# Patient Record
Sex: FEMALE | Race: WHITE | NOT HISPANIC OR LATINO | Employment: FULL TIME | URBAN - NONMETROPOLITAN AREA
[De-identification: names, ages, dates, MRNs, and addresses within clinical notes are randomized per-mention and may not be internally consistent; named-entity substitution may affect disease eponyms.]

---

## 2023-04-21 NOTE — PROGRESS NOTES
"Subjective     Candice Powell is a 67 y.o. female who presents for Establish Care (New patient/establish care).      HPI  The patient is a 67 year-old female presenting to the clinic as a new patient to establish care.  Discussed health maintenance. Educated the patient on preventive care.  Patient presents with shoulder pain.  Order placed for Xray 4/24/2023.  Discussed Tinnitus of ears.  Referral placed with ENT 4/24/2023.  Order placed for Mammogram 4/24/2023.  Order placed for Colonoscopy 4/24/2023.  Order placed for DEXA bone density scan 4/24/2023.  Complete blood work after fasting for 10 hours.  Follow up in office.     Educated on dyslipidemia and diet exercise.  We will continue monitor.  Educated on postmenopausal care and symptoms and management.  Complaining feeling tired.  Advised on diet exercise.  Educated on vitamin D deficiency.  Complaining of ringing in both ears.  Complaining pain in the right shoulder    On pain scale 7-8/10.  Squeezing throbbing pain.  Denies trauma.  Denies injury.      Review of Systems  Review of systems    General.  Denies fever.  Denies chills.    HEENT dictated as above  Respiratory.  Denies cough.  Denies shortness of breath.    Cardiovascular.  Denies chest pain.  Denies heart palpitations.  Denies shortness of breath.    Gastrointestinal.  Denies nausea vomiting diarrhea.  Denies abdominal pain.    Genitourinary denies burning urination.  Denies frequent urination.  Denies flank pain.  Denies blood in the urine.  Denies abnormal vaginal discharge.    Neurology.  Denies tingling numbness but denies weakness.  Denies headache.  Denies blurred vision.    Musculoskeletal.  Dictated as above  Endocrinology.  Denies cold intolerance.  Denies hot intolerance.    Psychiatric.  Denies depression.  Denies anxiety.  Denies suicidal.  Denies homicidal.    Objective   /82   Pulse 68   Ht 1.67 m (5' 5.75\")   Wt 101 kg (222 lb)   SpO2 98%   BMI 36.10 kg/m²        Physical " Exam  General.  Not in distress.  HEENT normocephalic anicteric sclerae.  Neck soft supple no thyromegaly.  No carotid bruit.  Lungs are clear.  Heart regular.  Abdomen soft nontender nondistended bowel sounds are positive.  Extremities no clubbing cyanosis or edema.  Psychiatric.  Has good eye contact.  No crying spells noted.  Speech was normal.  Denies depression.  Denies suicidal.  Denies homicidal.  Musculoskeletal.  Complaining pain with abduction of right shoulder.  Range of motion restricted with abduction.      Assessment/Plan     1.  Dyslipidemia.  Educated on diet exercise.  We will continue monitor.    2.  Postmenopausal.  Dictated as above.    3.  Fatigue.  Dictated as above.    4.  Vitamin D deficiency.  Educated on vitamin D deficiency.  We will continue monitor    5.  Tinnitus of both ears.  Recommended and referred to the ENT.    6.  Right shoulder pain.  Educated on shoulder exercises.  We will follow-up on the x-ray                          Problem List Items Addressed This Visit    None  Visit Diagnoses       Dyslipidemia    -  Primary    Relevant Orders    Comprehensive metabolic panel    Lipid panel    Colon cancer screening        Relevant Orders    Colonoscopy    Postmenopausal        Relevant Orders    XR DEXA bone density    Encounter for screening mammogram for malignant neoplasm of breast        Relevant Orders    BI mammo bilateral screening tomosynthesis    Other fatigue        Relevant Orders    CBC and Auto Differential    Urinalysis with Reflex Microscopic    Tsh With Reflex To Free T4 If Abnormal    Vitamin B12    Folate    Vitamin D deficiency        Relevant Orders    Vitamin D 25-Hydroxy,Total    Screening for condition        Relevant Orders    Hepatitis C antibody    Tinnitus of both ears        Relevant Orders    Referral to ENT    Acute pain of right shoulder        Relevant Orders    XR shoulder right 2+ views            Scribe Attestation  By signing my name below, IKarina  Ruby Mccray   attest that this documentation has been prepared under the direction and in the presence of Que Craft MD.

## 2023-04-24 ENCOUNTER — OFFICE VISIT (OUTPATIENT)
Dept: PRIMARY CARE | Facility: CLINIC | Age: 68
End: 2023-04-24
Payer: COMMERCIAL

## 2023-04-24 VITALS
OXYGEN SATURATION: 98 % | BODY MASS INDEX: 35.68 KG/M2 | HEART RATE: 68 BPM | HEIGHT: 66 IN | WEIGHT: 222 LBS | DIASTOLIC BLOOD PRESSURE: 82 MMHG | SYSTOLIC BLOOD PRESSURE: 142 MMHG

## 2023-04-24 DIAGNOSIS — Z78.0 POSTMENOPAUSAL: ICD-10-CM

## 2023-04-24 DIAGNOSIS — M25.511 ACUTE PAIN OF RIGHT SHOULDER: ICD-10-CM

## 2023-04-24 DIAGNOSIS — Z12.31 ENCOUNTER FOR SCREENING MAMMOGRAM FOR MALIGNANT NEOPLASM OF BREAST: ICD-10-CM

## 2023-04-24 DIAGNOSIS — H93.13 TINNITUS OF BOTH EARS: ICD-10-CM

## 2023-04-24 DIAGNOSIS — Z12.11 COLON CANCER SCREENING: ICD-10-CM

## 2023-04-24 DIAGNOSIS — Z13.9 SCREENING FOR CONDITION: ICD-10-CM

## 2023-04-24 DIAGNOSIS — R53.83 OTHER FATIGUE: ICD-10-CM

## 2023-04-24 DIAGNOSIS — E78.5 DYSLIPIDEMIA: Primary | ICD-10-CM

## 2023-04-24 DIAGNOSIS — E55.9 VITAMIN D DEFICIENCY: ICD-10-CM

## 2023-04-24 PROBLEM — Z76.89 ESTABLISHING CARE WITH NEW DOCTOR, ENCOUNTER FOR: Status: ACTIVE | Noted: 2023-04-24

## 2023-04-24 PROCEDURE — 1036F TOBACCO NON-USER: CPT | Performed by: FAMILY MEDICINE

## 2023-04-24 PROCEDURE — 1159F MED LIST DOCD IN RCRD: CPT | Performed by: FAMILY MEDICINE

## 2023-04-24 PROCEDURE — 99203 OFFICE O/P NEW LOW 30 MIN: CPT | Performed by: FAMILY MEDICINE

## 2023-04-24 PROCEDURE — 1160F RVW MEDS BY RX/DR IN RCRD: CPT | Performed by: FAMILY MEDICINE

## 2023-04-24 RX ORDER — VIT C/E/ZN/COPPR/LUTEIN/ZEAXAN 250MG-90MG
1000 CAPSULE ORAL DAILY
COMMUNITY

## 2023-04-24 ASSESSMENT — PAIN SCALES - GENERAL: PAINLEVEL: 0-NO PAIN

## 2023-04-24 ASSESSMENT — ENCOUNTER SYMPTOMS
LOSS OF SENSATION IN FEET: 0
OCCASIONAL FEELINGS OF UNSTEADINESS: 0
DEPRESSION: 0

## 2023-09-13 ENCOUNTER — OFFICE VISIT (OUTPATIENT)
Dept: PRIMARY CARE | Facility: CLINIC | Age: 68
End: 2023-09-13
Payer: COMMERCIAL

## 2023-09-13 VITALS
BODY MASS INDEX: 34.87 KG/M2 | HEIGHT: 66 IN | DIASTOLIC BLOOD PRESSURE: 88 MMHG | WEIGHT: 217 LBS | HEART RATE: 80 BPM | OXYGEN SATURATION: 97 % | SYSTOLIC BLOOD PRESSURE: 130 MMHG | TEMPERATURE: 97.5 F

## 2023-09-13 DIAGNOSIS — R05.1 ACUTE COUGH: Primary | ICD-10-CM

## 2023-09-13 PROBLEM — H93.13 TINNITUS OF BOTH EARS: Status: ACTIVE | Noted: 2020-09-14

## 2023-09-13 PROBLEM — G47.33 OSA (OBSTRUCTIVE SLEEP APNEA): Status: ACTIVE | Noted: 2017-10-24

## 2023-09-13 PROBLEM — M54.50 CHRONIC BILATERAL LOW BACK PAIN WITHOUT SCIATICA: Status: ACTIVE | Noted: 2017-10-24

## 2023-09-13 PROBLEM — H61.23 BILATERAL IMPACTED CERUMEN: Status: ACTIVE | Noted: 2018-04-30

## 2023-09-13 PROBLEM — H60.333 CHRONIC SWIMMER'S EAR OF BOTH SIDES: Status: ACTIVE | Noted: 2018-04-30

## 2023-09-13 PROBLEM — G47.00 INSOMNIA: Status: ACTIVE | Noted: 2017-10-24

## 2023-09-13 PROBLEM — L98.9 SKIN LESIONS: Status: ACTIVE | Noted: 2017-02-08

## 2023-09-13 PROBLEM — F43.9 STRESS: Status: ACTIVE | Noted: 2017-10-24

## 2023-09-13 PROBLEM — J01.01 ACUTE RECURRENT MAXILLARY SINUSITIS: Status: ACTIVE | Noted: 2018-04-30

## 2023-09-13 PROBLEM — G89.29 CHRONIC BILATERAL LOW BACK PAIN WITHOUT SCIATICA: Status: ACTIVE | Noted: 2017-10-24

## 2023-09-13 PROCEDURE — 1126F AMNT PAIN NOTED NONE PRSNT: CPT

## 2023-09-13 PROCEDURE — 1160F RVW MEDS BY RX/DR IN RCRD: CPT

## 2023-09-13 PROCEDURE — 99213 OFFICE O/P EST LOW 20 MIN: CPT

## 2023-09-13 PROCEDURE — 1036F TOBACCO NON-USER: CPT

## 2023-09-13 PROCEDURE — 1159F MED LIST DOCD IN RCRD: CPT

## 2023-09-13 PROCEDURE — 87635 SARS-COV-2 COVID-19 AMP PRB: CPT

## 2023-09-13 ASSESSMENT — ENCOUNTER SYMPTOMS
OCCASIONAL FEELINGS OF UNSTEADINESS: 0
DEPRESSION: 0
LOSS OF SENSATION IN FEET: 0

## 2023-09-13 ASSESSMENT — SOCIAL DETERMINANTS OF HEALTH (SDOH)

## 2023-09-13 ASSESSMENT — PATIENT HEALTH QUESTIONNAIRE - PHQ9
1. LITTLE INTEREST OR PLEASURE IN DOING THINGS: NOT AT ALL
2. FEELING DOWN, DEPRESSED OR HOPELESS: NOT AT ALL
SUM OF ALL RESPONSES TO PHQ9 QUESTIONS 1 & 2: 0

## 2023-09-13 NOTE — PROGRESS NOTES
Subjective   Patient ID: Candice Powell is a 67 y.o. female who presents for Establish Care (Candice is here to establish care. Was seen in urgent care for sinusitis. Has been in contact with spouse who is sick).  Establishing care today     Recent illness seen in Urgent care, diagnosed with Acute Sinusitis  Duration: Sick for 14 days, not feeling any improvement.     Started on Antibiotic Doxycycline on Thursday of last week with her  as well.    She has cough and congestion  Non-productive, no fevers noted.   Her  did test with home COVID test and was negative x2.     COVID swab today. It is my opinion that patient is at high risk of Acute COVID-19, all precautions were taken during the visit including masking with N95 and patient was masked.   Infection control maintained by provider and MA. Patient subsequently escorted out of the office by staff to minimize viral transmission.        Vitals:    09/13/23 1034   BP: 130/88   Pulse: 80   Temp: 36.4 °C (97.5 °F)   SpO2: 97%       Review of Systems    Objective   Physical Exam    Assessment/Plan   Problem List Items Addressed This Visit    None  Visit Diagnoses       Acute cough    -  Primary    Relevant Orders    Sars-CoV-2 PCR, Symptomatic                 Thank you for coming in today, please call my office if you have any concerns or questions.     Gatito EVANS, CNP

## 2023-09-13 NOTE — PATIENT INSTRUCTIONS
Mucinex DM otc would be good for cough, congestion take twice daily  Plenty of fluids  Finish antibiotic full course.     Thank you for coming in today, if any questions or concerns arise, please call my office.   CRISTINA Garcia-CNP

## 2023-09-14 ENCOUNTER — TELEPHONE (OUTPATIENT)
Dept: PRIMARY CARE | Facility: CLINIC | Age: 68
End: 2023-09-14
Payer: COMMERCIAL

## 2023-09-14 LAB — SARS-COV-2 RESULT: NOT DETECTED

## 2024-07-26 ENCOUNTER — APPOINTMENT (OUTPATIENT)
Dept: PRIMARY CARE | Facility: CLINIC | Age: 69
End: 2024-07-26
Payer: MEDICARE

## 2024-09-19 ENCOUNTER — APPOINTMENT (OUTPATIENT)
Dept: PRIMARY CARE | Facility: CLINIC | Age: 69
End: 2024-09-19
Payer: MEDICARE

## 2024-09-19 VITALS
SYSTOLIC BLOOD PRESSURE: 130 MMHG | BODY MASS INDEX: 36.15 KG/M2 | TEMPERATURE: 97.3 F | WEIGHT: 224 LBS | OXYGEN SATURATION: 98 % | DIASTOLIC BLOOD PRESSURE: 70 MMHG | HEART RATE: 82 BPM

## 2024-09-19 DIAGNOSIS — M17.12 LOCALIZED OSTEOARTHRITIS OF LEFT KNEE: Primary | ICD-10-CM

## 2024-09-19 PROCEDURE — 1036F TOBACCO NON-USER: CPT

## 2024-09-19 PROCEDURE — 99214 OFFICE O/P EST MOD 30 MIN: CPT

## 2024-09-19 PROCEDURE — 1159F MED LIST DOCD IN RCRD: CPT

## 2024-09-19 ASSESSMENT — ENCOUNTER SYMPTOMS
JOINT SWELLING: 1
ARTHRALGIAS: 1

## 2024-09-19 NOTE — PROGRESS NOTES
"Subjective   Patient ID: Candice Powell is a 68 y.o. female who presents for Knee Pain (L knee- intermittent- not painful today, she states it feels like something is not quite right, sometimes when walking her pain will be excruciating. She states sometimes it will feel like her knee cap is just \"floating\").  Knee Pain  Patient presents for follow up on a knee problem involving the left knee. Onset of the symptoms was several months ago. Inciting event: this is a longstanding problem which has been getting worse. Current symptoms include crepitus sensation, foreign body sensation, giving out, and states Patella feels like it is \"floating\" at times. Pain is aggravated by any weight bearing, lateral movements, and rising after sitting. Patient has had prior knee problems. Evaluation to date: none. Treatment to date: avoidance of offending activity.          Vitals:    09/19/24 0918   BP: 130/70   Pulse: 82   Temp: 36.3 °C (97.3 °F)   SpO2: 98%       Review of Systems   Musculoskeletal:  Positive for arthralgias, gait problem and joint swelling.       Objective   Physical Exam  Vitals and nursing note reviewed.   Constitutional:       Appearance: Normal appearance.   Musculoskeletal:      Right knee: Normal.      Left knee: Swelling, deformity, bony tenderness and crepitus present. No LCL laxity, MCL laxity, ACL laxity or PCL laxity.Normal patellar mobility.        Legs:    Neurological:      Mental Status: She is alert.         Assessment/Plan   Problem List Items Addressed This Visit    None  Visit Diagnoses       Localized osteoarthritis of left knee    -  Primary    Relevant Orders    XR knee left 3 views                 Thank you for coming in today, please call my office if you have any concerns or questions.     Gatito EVANS, CNP  "

## 2024-09-19 NOTE — PATIENT INSTRUCTIONS
ROM exercises with the shoulder    XR knee, can use compression brace  Voltaren Gel    Thank you for coming in today, if any questions or concerns arise, please call my office.   CRISTINA Garcia-CNP

## 2024-10-04 ENCOUNTER — HOSPITAL ENCOUNTER (OUTPATIENT)
Dept: RADIOLOGY | Facility: CLINIC | Age: 69
Discharge: HOME | End: 2024-10-04
Payer: MEDICARE

## 2024-10-04 DIAGNOSIS — M17.12 LOCALIZED OSTEOARTHRITIS OF LEFT KNEE: ICD-10-CM

## 2024-10-04 PROCEDURE — 73562 X-RAY EXAM OF KNEE 3: CPT | Mod: LT

## 2024-10-09 ENCOUNTER — TELEPHONE (OUTPATIENT)
Dept: PRIMARY CARE | Facility: CLINIC | Age: 69
End: 2024-10-09
Payer: MEDICARE

## 2024-10-09 NOTE — TELEPHONE ENCOUNTER
----- Message from Gatito Ag sent at 10/8/2024  3:39 PM EDT -----  Results were abnormal... no acute fracture, there is remote fracture deformity of the proximal fibula, this does not need surgery nor does it need further eval per my discussion with orthopedic. Let me know how she is doing with her conservative therapies. Thanks.

## 2024-10-09 NOTE — TELEPHONE ENCOUNTER
Candice notified of results and recommendations. She states she has good days and bad days with the conservative therapies.

## 2025-04-04 ENCOUNTER — PATIENT OUTREACH (OUTPATIENT)
Dept: CARE COORDINATION | Facility: CLINIC | Age: 70
End: 2025-04-04
Payer: MEDICARE

## 2025-04-09 ENCOUNTER — PATIENT OUTREACH (OUTPATIENT)
Dept: CARE COORDINATION | Facility: CLINIC | Age: 70
End: 2025-04-09
Payer: MEDICARE

## 2025-05-23 PROBLEM — Z17.0 MALIGNANT NEOPLASM OF UPPER-INNER QUADRANT OF LEFT BREAST IN FEMALE, ESTROGEN RECEPTOR POSITIVE: Status: ACTIVE | Noted: 2025-05-23

## 2025-05-23 PROBLEM — C50.212 MALIGNANT NEOPLASM OF UPPER-INNER QUADRANT OF LEFT BREAST IN FEMALE, ESTROGEN RECEPTOR POSITIVE: Status: ACTIVE | Noted: 2025-05-23

## 2025-05-30 ENCOUNTER — TELEPHONE (OUTPATIENT)
Dept: SURGERY | Facility: CLINIC | Age: 70
End: 2025-05-30

## 2025-05-30 ENCOUNTER — APPOINTMENT (OUTPATIENT)
Dept: SURGERY | Facility: CLINIC | Age: 70
End: 2025-05-30
Payer: MEDICARE

## 2025-05-30 VITALS
DIASTOLIC BLOOD PRESSURE: 79 MMHG | HEIGHT: 65 IN | TEMPERATURE: 98 F | HEART RATE: 76 BPM | WEIGHT: 230 LBS | SYSTOLIC BLOOD PRESSURE: 161 MMHG | BODY MASS INDEX: 38.32 KG/M2

## 2025-05-30 DIAGNOSIS — Z17.0 MALIGNANT NEOPLASM OF UPPER-INNER QUADRANT OF LEFT BREAST IN FEMALE, ESTROGEN RECEPTOR POSITIVE: Primary | ICD-10-CM

## 2025-05-30 DIAGNOSIS — Z17.0 MALIGNANT NEOPLASM OF UPPER-INNER QUADRANT OF LEFT BREAST IN FEMALE, ESTROGEN RECEPTOR POSITIVE: ICD-10-CM

## 2025-05-30 DIAGNOSIS — C50.212 MALIGNANT NEOPLASM OF UPPER-INNER QUADRANT OF LEFT BREAST IN FEMALE, ESTROGEN RECEPTOR POSITIVE: ICD-10-CM

## 2025-05-30 DIAGNOSIS — C50.212 MALIGNANT NEOPLASM OF UPPER-INNER QUADRANT OF LEFT BREAST IN FEMALE, ESTROGEN RECEPTOR POSITIVE: Primary | ICD-10-CM

## 2025-05-30 PROCEDURE — 99205 OFFICE O/P NEW HI 60 MIN: CPT | Performed by: SURGERY

## 2025-05-30 PROCEDURE — 1160F RVW MEDS BY RX/DR IN RCRD: CPT | Performed by: SURGERY

## 2025-05-30 PROCEDURE — 1159F MED LIST DOCD IN RCRD: CPT | Performed by: SURGERY

## 2025-05-30 PROCEDURE — 3008F BODY MASS INDEX DOCD: CPT | Performed by: SURGERY

## 2025-05-30 PROCEDURE — 1036F TOBACCO NON-USER: CPT | Performed by: SURGERY

## 2025-05-30 RX ORDER — CEFAZOLIN SODIUM 2 G/100ML
2 INJECTION, SOLUTION INTRAVENOUS ONCE
OUTPATIENT
Start: 2025-05-30 | End: 2025-05-30

## 2025-05-30 RX ORDER — ACETAMINOPHEN 325 MG/1
975 TABLET ORAL ONCE
OUTPATIENT
Start: 2025-05-30 | End: 2025-05-30

## 2025-05-30 RX ORDER — CELECOXIB 50 MG/1
200 CAPSULE ORAL ONCE
OUTPATIENT
Start: 2025-05-30 | End: 2025-05-30

## 2025-05-30 NOTE — PROGRESS NOTES
Subjective   Patient ID: Candice Powell is a 69 y.o. female who presents for second opinion on her left breast cancer.    HPI   This the patient was diagnosed with a left breast cancer at [facility].  She underwent image guided biopsy by a surgeon there.  This confirmed a cT1 infiltrating ductal cancer ER 80 OK 0 HER2 negative.  The left axilla was not interrogated by ultrasound.  She presents for second opinion and to discuss her surgical options.  NO FH of breast or ovarian cancer, NO previous breast surgery except for her recent biopsy, Menses at 13 , Menopause at 49 , Children 3 , Age at first child 15 , Age at last child 22 ,  Breast feeding last child only, Nipple discharge NO, Breast pain YES -at the site of the biopsy. birth control pill NO, Radiation NO, History of collagen vascular disease NO .      Medical History[1]     Medications Ordered Prior to Encounter[2]     Review of Systems   All other systems reviewed and are negative.      Vitals:    05/30/25 0844   BP: 161/79   Pulse: 76   Temp: 36.7 °C (98 °F)        Objective     Physical Exam  Vitals reviewed. Exam conducted with a chaperone present.   Constitutional:       Appearance: Normal appearance.   HENT:      Head: Normocephalic.   Cardiovascular:      Rate and Rhythm: Normal rate and regular rhythm.      Heart sounds: Normal heart sounds.   Pulmonary:      Effort: Pulmonary effort is normal.      Breath sounds: Normal breath sounds.   Chest:   Breasts:     Right: Normal.      Left: Normal.          Comments: Small lesion seen left breast at approximately 11-12 o'clock 11 cm from the nipple with ultrasound.  Clip noted.  Abdominal:      General: Abdomen is flat.      Palpations: Abdomen is soft. There is no mass.      Tenderness: There is no abdominal tenderness. There is no guarding.   Musculoskeletal:         General: Normal range of motion.   Lymphadenopathy:      Upper Body:      Right upper body: No axillary adenopathy.      Left upper body: No  axillary adenopathy.   Skin:     General: Skin is warm.   Neurological:      General: No focal deficit present.   Psychiatric:         Mood and Affect: Mood normal.       Review of ultrasound performed April 23, 2025 [facility]  BI mammo left diagnostic tomosynthesis  Order: [id number]  Impression    IMPRESSION:  Finding in the left breast is suspicious for malignancy.  Ultrasound-guided biopsy is recommended.    Review of pathology  SURGICAL PATHOLOGY  Order: [id number]  Component 1 mo ago   Case Report Surgical Pathology Report                         Case: [id number]                                Authorizing Provider:  Magda Blanchard MD Collected:           04/30/2025 07:43 AM          Ordering Location:     [facility] [place] [facility]            Received:            04/30/2025 10:06 AM          Pathologist:           Shreya Roberson MD                                                            Specimen:    Breast, Left, Core Biopsy, Left breast biopsy                                           FINAL DIAGNOSIS    A.  Left breast, core biopsy: Consistent with invasive ductal carcinoma.  Comment: The needle biopsy shows the malignant infiltrate in a single file pattern or small solid nests suspicious for infiltrating lobular carcinoma.  An immunostain for E-cadherin was performed.  The tumor cells stain strongly positive for E-cadherin..  The findings are consistent with invasive ductal carcinoma.  An intradepartmental consultation was obtained.  Breast marker profile studies (ER, IA and HER2) are being performed at [facility].  The results can be viewed in EPIC when they become available.     Problem List Items Addressed This Visit       Malignant neoplasm of upper-inner quadrant of left breast in female, estrogen receptor positive - Primary   cT1 NX M0 infiltrating ductal cancer ER 80 IA 0 HER2 negative left breast    Assessment/Plan   Plan-I had a long discussion with the patient and her .  We discussed breast  conserving surgery versus mastectomy.  I discussed with her the choosing wisely guidelines were breast conserving therapy would incorporate excision of the breast cancer only and not require sentinel biopsy.  We discussed adjuvant radiation therapy.  We discussed adjuvant hormonal therapy.  I indicated to her that adjuvant radiation therapy is necessary to prevent the risk of local recurrence.  She wishes to undergo breast conserving surgery.  She was scheduled on July 1, 2025.  She will require Magseed placement prior to her procedure and interrogation of the left axilla.  Her films have been downloaded into the  system for review by the breast center.      LEFT MAGSEED PARTIAL MASTECTOMY 7.1.25   Risks include, but not limited to pain, infection, bleeding,  risk of positive margins, need for re-excision, risk of cardiac, pulmonary, neurologic, locomotor, anesthetic events,  and other unforeseen complications including death.    Magseed to be placed in the left breast.  Left axillary ultrasound to be performed to image left axilla        Don Roger MD        [1] History reviewed. No pertinent past medical history.  [2]   Current Outpatient Medications on File Prior to Visit   Medication Sig Dispense Refill    cholecalciferol (Vitamin D-3) 25 MCG (1000 UT) capsule Take 1 capsule (25 mcg) by mouth once daily.      multivitamin tablet,chewable Chew 1 tablet early in the morning..       No current facility-administered medications on file prior to visit.

## 2025-05-30 NOTE — PATIENT INSTRUCTIONS
You have stage I cancer of the left breast as we discussed.  You have opted undergo breast conserving therapy.  We discussed the choosing wisely guidelines which would recommend removing the tumor only.  You will have a Magseed placed prior to this.  You will also have ultrasound of the left axilla.  Your surgery will be performed on July 1, 2025.  My office will provide you with preprocedure instructions.  They will also set up your Magseed and left x-ray ultrasound.  I have given your breast cancer folder to read.  If you have any questions do not hesitate to contact my office.  I will contact you before your surgery to follow-up after Magseed placement.  You will also be referred to medical oncology and radiation oncology.  This will occur  3 weeks after your surgery

## 2025-05-30 NOTE — TELEPHONE ENCOUNTER
----- Message from Don Roger sent at 5/30/2025  9:30 AM EDT -----  Ronel - confirm receipt of note by Dr Flores

## 2025-06-11 ENCOUNTER — PRE-ADMISSION TESTING (OUTPATIENT)
Dept: PREADMISSION TESTING | Facility: HOSPITAL | Age: 70
End: 2025-06-11
Payer: MEDICARE

## 2025-06-11 ENCOUNTER — ANESTHESIA EVENT (OUTPATIENT)
Dept: OPERATING ROOM | Facility: HOSPITAL | Age: 70
End: 2025-06-11
Payer: MEDICARE

## 2025-06-11 VITALS
TEMPERATURE: 97 F | RESPIRATION RATE: 16 BRPM | SYSTOLIC BLOOD PRESSURE: 157 MMHG | DIASTOLIC BLOOD PRESSURE: 84 MMHG | HEART RATE: 67 BPM | OXYGEN SATURATION: 99 %

## 2025-06-11 DIAGNOSIS — R19.04 LEFT LOWER QUADRANT ABDOMINAL MASS: Primary | ICD-10-CM

## 2025-06-11 DIAGNOSIS — Z01.818 PREOP TESTING: ICD-10-CM

## 2025-06-11 PROCEDURE — 87081 CULTURE SCREEN ONLY: CPT | Mod: GENLAB

## 2025-06-11 RX ORDER — CHLORHEXIDINE GLUCONATE ORAL RINSE 1.2 MG/ML
15 SOLUTION DENTAL DAILY
Qty: 30 ML | Refills: 0 | Status: SHIPPED | OUTPATIENT
Start: 2025-06-11 | End: 2025-06-13

## 2025-06-11 RX ORDER — ERGOCALCIFEROL 1.25 MG/1
1.25 CAPSULE ORAL WEEKLY
COMMUNITY

## 2025-06-11 RX ORDER — CHLORHEXIDINE GLUCONATE 40 MG/ML
SOLUTION TOPICAL DAILY
Qty: 354 ML | Refills: 0 | Status: SHIPPED | OUTPATIENT
Start: 2025-06-11 | End: 2025-06-16

## 2025-06-11 ASSESSMENT — DUKE ACTIVITY SCORE INDEX (DASI)
DASI METS SCORE: 8
CAN YOU RUN A SHORT DISTANCE: NO
CAN YOU PARTICIPATE IN STRENOUS SPORTS LIKE SWIMMING, SINGLES TENNIS, FOOTBALL, BASKETBALL, OR SKIING: NO
CAN YOU DO LIGHT WORK AROUND THE HOUSE LIKE DUSTING OR WASHING DISHES: YES
CAN YOU WALK A BLOCK OR TWO ON LEVEL GROUND: YES
CAN YOU DO HEAVY WORK AROUND THE HOUSE LIKE SCRUBBING FLOORS OR LIFTING AND MOVING HEAVY FURNITURE: YES
CAN YOU CLIMB A FLIGHT OF STAIRS OR WALK UP A HILL: YES
CAN YOU DO YARD WORK LIKE RAKING LEAVES, WEEDING OR PUSHING A MOWER: YES
CAN YOU DO MODERATE WORK AROUND THE HOUSE LIKE VACUUMING, SWEEPING FLOORS OR CARRYING GROCERIES: YES
CAN YOU PARTICIPATE IN MODERATE RECREATIONAL ACTIVITIES LIKE GOLF, BOWLING, DANCING, DOUBLES TENNIS OR THROWING A BASEBALL OR FOOTBALL: YES
CAN YOU WALK INDOORS, SUCH AS AROUND YOUR HOUSE: YES
TOTAL_SCORE: 42.7
CAN YOU HAVE SEXUAL RELATIONS: YES
CAN YOU TAKE CARE OF YOURSELF (EAT, DRESS, BATHE, OR USE TOILET): YES

## 2025-06-11 ASSESSMENT — PAIN - FUNCTIONAL ASSESSMENT: PAIN_FUNCTIONAL_ASSESSMENT: 0-10

## 2025-06-11 ASSESSMENT — PAIN SCALES - GENERAL: PAINLEVEL_OUTOF10: 0 - NO PAIN

## 2025-06-11 NOTE — PREPROCEDURE INSTRUCTIONS
Medication List            Accurate as of June 11, 2025  8:50 AM. Always use your most recent med list.                * chlorhexidine 4 % external liquid  Commonly known as: Hibiclens  Apply topically once daily for 5 days.  Medication Adjustments for Surgery: Take/Use as prescribed     * chlorhexidine 0.12 % solution  Commonly known as: Peridex  Use 15 mL in the mouth or throat once daily for 2 days. Once the night before surgery and once the morning of  Medication Adjustments for Surgery: Take/Use as prescribed     ergocalciferol 1250 mcg (50,000 units) capsule  Commonly known as: Vitamin D-2  Additional Medication Adjustments for Surgery: Take last dose 7 days before surgery     MAGNESIUM GLUCONATE ORAL  Additional Medication Adjustments for Surgery: Take last dose 7 days before surgery           * This list has 2 medication(s) that are the same as other medications prescribed for you. Read the directions carefully, and ask your doctor or other care provider to review them with you.                              Call outpatient surgery the day before between 1-3 pm to get your arrival time.   [phone]    No food after midnight including candy, gum or mints.     You can have clear liquids up to 3 hrs prior to your procedure.  NO DAIRY, NO CREAMER, NO NON DAIRY OR ALMOND, OAT, COCONUT ETC.    Please refrain from smoking THC, cigarettes or vaping prior to your procedure at least 24 hrs.     When  you arrive park in the back ER parking lot.  Come through the ER lobby and take the first elevator to second floor.   Check in on the outpatient surgery window.    Leave all valuables at home and remove all piercing's.      Do mouth wash and bath for infection control if applicable.      NO driving for 24 hrs if you have had anesthesia or sedation.   You will also need a  if you are receiving sedation.       Bring glasses so you can read what you are signing.

## 2025-06-11 NOTE — ANESTHESIA PREPROCEDURE EVALUATION
Patient: Candice Powell    Procedure Information       Date/Time: 07/01/25 0920    Procedure: PARTIAL MASTECTOMY  BREAST AFTER MAGNETIC SEED LOCALIZATION (Left)    Location: [facility] OR [facility] / Virtual [facility] OR    Surgeons: Don Roger MD            Relevant Problems   Anesthesia (within normal limits)      Cardiac   (+) Heart murmur   (+) Mild aortic regurgitation      Pulmonary   (+) ALDA (obstructive sleep apnea)      Neuro (within normal limits)      GI (within normal limits)      /Renal (within normal limits)      Liver (within normal limits)      Endocrine   (+) Obesity      Hematology (within normal limits)      Musculoskeletal   (+) Chronic bilateral low back pain without sciatica   (+) DJD (degenerative joint disease)      ID (within normal limits)      Skin (within normal limits)      GYN   (+) Malignant neoplasm of upper-inner quadrant of left breast in female, estrogen receptor positive     There were no vitals filed for this visit.    Surgical History[1]  Medical History[2]  Current Medications[3]  Prior to Admission medications    Medication Sig Start Date End Date Taking? Authorizing Provider   chlorhexidine (Hibiclens) 4 % external liquid Apply topically once daily for 5 days. 6/11/25 6/16/25  Ammy Tran PA-C   chlorhexidine (Peridex) 0.12 % solution Use 15 mL in the mouth or throat once daily for 2 days. Once the night before surgery and once the morning of 6/11/25 6/13/25  Ammy Tran PA-C   cholecalciferol (Vitamin D-3) 25 MCG (1000 UT) capsule Take 1 capsule (25 mcg) by mouth once daily.    Historical Provider, MD   multivitamin tablet,chewable Chew 1 tablet early in the morning..    Historical Provider, MD     RX Allergies[4]  Social History     Tobacco Use    Smoking status: Never    Smokeless tobacco: Never   Substance Use Topics    Alcohol use: Not Currently         Chemistry    Lab Results   Component Value Date/Time     05/19/2020 0011    K 4.1 05/19/2020 0011     05/19/2020  0011    CO2 28 2020 0011    BUN 16 2020 0011    CREATININE 0.79 2020 0011    Lab Results   Component Value Date/Time    CALCIUM 9.0 2020 0011          Lab Results   Component Value Date/Time    WBC 13.0 (H) 2020 0012    HGB 13.2 2020 0012    HCT 39.8 2020 0012     2020 0012     Lab Results   Component Value Date/Time    PROTIME 11.3 2020 1401    INR 1.0 2020 1401     No results found for this or any previous visit (from the past 4464 hours).  No results found for this or any previous visit from the past 1095 days.    Clinical information reviewed:                 Chart reviewed.  No clearances ordered.  Pneumonia in , resolved.    NPO Detail:  No data recorded     Physical Exam    Airway  Mallampati: II     Cardiovascular - normal exam   Dental    Pulmonary - normal exam   Abdominal - normal exam           Anesthesia Plan    History of general anesthesia?: yes  History of complications of general anesthesia?: no    ASA 3     general     The patient is not a current smoker.  Patient did not smoke on day of procedure.    intravenous induction   Anesthetic plan and risks discussed with patient.             [1]   Past Surgical History:  Procedure Laterality Date     SECTION, CLASSIC      HYSTERECTOMY     [2] No past medical history on file.  [3] No current facility-administered medications for this encounter.    Current Outpatient Medications:     chlorhexidine (Hibiclens) 4 % external liquid, Apply topically once daily for 5 days., Disp: 354 mL, Rfl: 0    chlorhexidine (Peridex) 0.12 % solution, Use 15 mL in the mouth or throat once daily for 2 days. Once the night before surgery and once the morning of, Disp: 30 mL, Rfl: 0    cholecalciferol (Vitamin D-3) 25 MCG (1000 UT) capsule, Take 1 capsule (25 mcg) by mouth once daily., Disp: , Rfl:     multivitamin tablet,chewable, Chew 1 tablet early in the morning.., Disp: , Rfl:   [4]    Allergies  Allergen Reactions    Corticosteroids (Glucocorticoids) Other     Patient states she has 'bad thoughts' when taking steroids.    Erythromycin GI Upset    Ciprofloxacin Itching and Rash

## 2025-06-13 LAB — STAPHYLOCOCCUS SPEC CULT: ABNORMAL

## 2025-06-24 ENCOUNTER — HOSPITAL ENCOUNTER (OUTPATIENT)
Dept: RADIOLOGY | Facility: EXTERNAL LOCATION | Age: 70
Discharge: HOME | End: 2025-06-24

## 2025-06-25 ENCOUNTER — HOSPITAL ENCOUNTER (OUTPATIENT)
Dept: RADIOLOGY | Facility: HOSPITAL | Age: 70
Discharge: HOME | End: 2025-06-25
Payer: MEDICARE

## 2025-06-25 VITALS — HEIGHT: 65 IN | BODY MASS INDEX: 38.32 KG/M2 | WEIGHT: 230 LBS

## 2025-06-25 DIAGNOSIS — R92.8 OTHER ABNORMAL AND INCONCLUSIVE FINDINGS ON DIAGNOSTIC IMAGING OF BREAST: ICD-10-CM

## 2025-06-25 DIAGNOSIS — C50.212 MALIGNANT NEOPLASM OF UPPER-INNER QUADRANT OF LEFT BREAST IN FEMALE, ESTROGEN RECEPTOR POSITIVE: ICD-10-CM

## 2025-06-25 DIAGNOSIS — N63.20 LEFT BREAST MASS: ICD-10-CM

## 2025-06-25 DIAGNOSIS — Z17.0 MALIGNANT NEOPLASM OF UPPER-INNER QUADRANT OF LEFT BREAST IN FEMALE, ESTROGEN RECEPTOR POSITIVE: ICD-10-CM

## 2025-06-25 PROCEDURE — 77065 DX MAMMO INCL CAD UNI: CPT | Mod: LT

## 2025-06-25 PROCEDURE — 19281 PERQ DEVICE BREAST 1ST IMAG: CPT | Mod: LT

## 2025-06-25 PROCEDURE — 2780000003 HC OR 278 NO HCPCS

## 2025-06-25 PROCEDURE — C1739 HC OR 278 NO HCPCS: HCPCS

## 2025-06-25 PROCEDURE — 2500000004 HC RX 250 GENERAL PHARMACY W/ HCPCS (ALT 636 FOR OP/ED): Performed by: RADIOLOGY

## 2025-06-25 RX ADMIN — Medication 10 ML: at 13:22

## 2025-06-25 ASSESSMENT — PAIN - FUNCTIONAL ASSESSMENT
PAIN_FUNCTIONAL_ASSESSMENT: 0-10
PAIN_FUNCTIONAL_ASSESSMENT: 0-10

## 2025-06-25 ASSESSMENT — PAIN SCALES - GENERAL
PAINLEVEL_OUTOF10: 0 - NO PAIN

## 2025-06-25 NOTE — DISCHARGE INSTRUCTIONS
AFTER THE TEST  A steri-strip and bandage will be placed over the incision. You may shower after 24 hours. Remove bandage after 24 hours. Remove bandage after the shower. Leave the steri-strips in place to fall off on their own. If after 1 week the steri-strips are still on, you may remove them. Avoid swimming or soaking in tub for 3 days.     You may have mild discomfort at the test site. If needed, you may take Tylenol (Acetaminophen) for pain. Please avoid taking NSAIDs, Motrin, Advil, Aleve, or ibuprofen for 24 hours following the biopsy. After 24 hours you may resume NSAIDSs.     If you take aspirin, Plavix, Coumadin, Xarelto or Eliquis please tell us. If these medications were stopped by your provider, please ask them when to resume.     You may have some tenderness, bruising or slight bleeding at the site.     Most people can return to their usual routine after the procedure. Avoid Strenuous activity for 24 hours.     Sleep in a bra the night after your biopsy. Continue to do so for comfort.     Call your provider if you have any of the following symptoms :  Fever  Increased pain  Increased bleeding  Redness  Increased swelling  Yellowish drainage    Patient education brochure and pain/comfort measures have been reviewed.   Phone number provided to contact Breast Center if problems arise.     Patient verbalized understanding of home going instructions.      1355-Pt discharged home at this time.

## 2025-06-25 NOTE — Clinical Note
incision dry, steri strips intact and compression dressing applied. Clip films completed and okayed

## 2025-06-26 ENCOUNTER — TELEPHONE (OUTPATIENT)
Dept: SURGERY | Facility: HOSPITAL | Age: 70
End: 2025-06-26
Payer: MEDICARE

## 2025-06-26 NOTE — TELEPHONE ENCOUNTER
I contacted the patient today.  She underwent left breast Magseed placement on 6/25/2025.  She did have an ultrasound of the axilla performed in the clinic clinic on 4/23/2025 which confirmed no axillary lymph nodes and therefore she did not require a follow-up ultrasound of the left axilla.  She has been scheduled for surgery 7/1/2025 and she understands and agrees to the plan as outlined.

## 2025-07-01 ENCOUNTER — HOSPITAL ENCOUNTER (OUTPATIENT)
Facility: HOSPITAL | Age: 70
Setting detail: OUTPATIENT SURGERY
Discharge: HOME | End: 2025-07-01
Attending: SURGERY | Admitting: SURGERY
Payer: MEDICARE

## 2025-07-01 ENCOUNTER — HOSPITAL ENCOUNTER (OUTPATIENT)
Dept: RADIOLOGY | Facility: HOSPITAL | Age: 70
Discharge: HOME | End: 2025-07-01
Payer: MEDICARE

## 2025-07-01 ENCOUNTER — ANESTHESIA (OUTPATIENT)
Dept: OPERATING ROOM | Facility: HOSPITAL | Age: 70
End: 2025-07-01
Payer: MEDICARE

## 2025-07-01 VITALS
DIASTOLIC BLOOD PRESSURE: 75 MMHG | OXYGEN SATURATION: 97 % | SYSTOLIC BLOOD PRESSURE: 155 MMHG | TEMPERATURE: 97.5 F | HEART RATE: 61 BPM | RESPIRATION RATE: 17 BRPM

## 2025-07-01 DIAGNOSIS — Z17.0 MALIGNANT NEOPLASM OF UPPER-INNER QUADRANT OF LEFT BREAST IN FEMALE, ESTROGEN RECEPTOR POSITIVE: ICD-10-CM

## 2025-07-01 DIAGNOSIS — C50.212 MALIGNANT NEOPLASM OF UPPER-INNER QUADRANT OF LEFT BREAST IN FEMALE, ESTROGEN RECEPTOR POSITIVE: ICD-10-CM

## 2025-07-01 DIAGNOSIS — Z17.0 MALIGNANT NEOPLASM OF UPPER-INNER QUADRANT OF LEFT BREAST IN FEMALE, ESTROGEN RECEPTOR POSITIVE: Primary | ICD-10-CM

## 2025-07-01 DIAGNOSIS — C50.212 MALIGNANT NEOPLASM OF UPPER-INNER QUADRANT OF LEFT BREAST IN FEMALE, ESTROGEN RECEPTOR POSITIVE: Primary | ICD-10-CM

## 2025-07-01 PROCEDURE — 88307 TISSUE EXAM BY PATHOLOGIST: CPT | Performed by: PATHOLOGY

## 2025-07-01 PROCEDURE — 3700000002 HC GENERAL ANESTHESIA TIME - EACH INCREMENTAL 1 MINUTE: Performed by: SURGERY

## 2025-07-01 PROCEDURE — 76098 X-RAY EXAM SURGICAL SPECIMEN: CPT | Performed by: RADIOLOGY

## 2025-07-01 PROCEDURE — 88342 IMHCHEM/IMCYTCHM 1ST ANTB: CPT | Performed by: PATHOLOGY

## 2025-07-01 PROCEDURE — 2720000007 HC OR 272 NO HCPCS: Performed by: SURGERY

## 2025-07-01 PROCEDURE — 3600000004 HC OR TIME - INITIAL BASE CHARGE - PROCEDURE LEVEL FOUR: Performed by: SURGERY

## 2025-07-01 PROCEDURE — 2500000001 HC RX 250 WO HCPCS SELF ADMINISTERED DRUGS (ALT 637 FOR MEDICARE OP)

## 2025-07-01 PROCEDURE — 19301 PARTIAL MASTECTOMY: CPT | Performed by: SURGERY

## 2025-07-01 PROCEDURE — 7100000001 HC RECOVERY ROOM TIME - INITIAL BASE CHARGE: Performed by: SURGERY

## 2025-07-01 PROCEDURE — 2500000004 HC RX 250 GENERAL PHARMACY W/ HCPCS (ALT 636 FOR OP/ED): Performed by: SURGERY

## 2025-07-01 PROCEDURE — 2500000004 HC RX 250 GENERAL PHARMACY W/ HCPCS (ALT 636 FOR OP/ED): Performed by: NURSE ANESTHETIST, CERTIFIED REGISTERED

## 2025-07-01 PROCEDURE — 2500000004 HC RX 250 GENERAL PHARMACY W/ HCPCS (ALT 636 FOR OP/ED)

## 2025-07-01 PROCEDURE — 7100000009 HC PHASE TWO TIME - INITIAL BASE CHARGE: Performed by: SURGERY

## 2025-07-01 PROCEDURE — 2500000001 HC RX 250 WO HCPCS SELF ADMINISTERED DRUGS (ALT 637 FOR MEDICARE OP): Performed by: SURGERY

## 2025-07-01 PROCEDURE — 88307 TISSUE EXAM BY PATHOLOGIST: CPT | Mod: TC,GENLAB,WESLAB | Performed by: SURGERY

## 2025-07-01 PROCEDURE — 2500000005 HC RX 250 GENERAL PHARMACY W/O HCPCS

## 2025-07-01 PROCEDURE — 19301 PARTIAL MASTECTOMY: CPT | Performed by: PHYSICIAN ASSISTANT

## 2025-07-01 PROCEDURE — 3700000001 HC GENERAL ANESTHESIA TIME - INITIAL BASE CHARGE: Performed by: SURGERY

## 2025-07-01 PROCEDURE — 7100000010 HC PHASE TWO TIME - EACH INCREMENTAL 1 MINUTE: Performed by: SURGERY

## 2025-07-01 PROCEDURE — 3600000009 HC OR TIME - EACH INCREMENTAL 1 MINUTE - PROCEDURE LEVEL FOUR: Performed by: SURGERY

## 2025-07-01 PROCEDURE — 76098 X-RAY EXAM SURGICAL SPECIMEN: CPT

## 2025-07-01 PROCEDURE — 7100000002 HC RECOVERY ROOM TIME - EACH INCREMENTAL 1 MINUTE: Performed by: SURGERY

## 2025-07-01 RX ORDER — PNV NO.95/FERROUS FUM/FOLIC AC 28MG-0.8MG
100 TABLET ORAL DAILY
COMMUNITY

## 2025-07-01 RX ORDER — FENTANYL CITRATE 50 UG/ML
25 INJECTION, SOLUTION INTRAMUSCULAR; INTRAVENOUS EVERY 5 MIN PRN
Status: DISCONTINUED | OUTPATIENT
Start: 2025-07-01 | End: 2025-07-01 | Stop reason: HOSPADM

## 2025-07-01 RX ORDER — CELECOXIB 100 MG/1
200 CAPSULE ORAL ONCE
Status: COMPLETED | OUTPATIENT
Start: 2025-07-01 | End: 2025-07-01

## 2025-07-01 RX ORDER — ONDANSETRON HYDROCHLORIDE 2 MG/ML
INJECTION, SOLUTION INTRAVENOUS AS NEEDED
Status: DISCONTINUED | OUTPATIENT
Start: 2025-07-01 | End: 2025-07-01

## 2025-07-01 RX ORDER — ACETAMINOPHEN 325 MG/1
650 TABLET ORAL EVERY 4 HOURS PRN
Status: DISCONTINUED | OUTPATIENT
Start: 2025-07-01 | End: 2025-07-01 | Stop reason: HOSPADM

## 2025-07-01 RX ORDER — IBUPROFEN 600 MG/1
600 TABLET, FILM COATED ORAL EVERY 6 HOURS PRN
COMMUNITY
Start: 2025-07-01 | End: 2025-07-11

## 2025-07-01 RX ORDER — PROPOFOL 10 MG/ML
INJECTION, EMULSION INTRAVENOUS AS NEEDED
Status: DISCONTINUED | OUTPATIENT
Start: 2025-07-01 | End: 2025-07-01

## 2025-07-01 RX ORDER — CEFAZOLIN SODIUM 2 G/50ML
2 SOLUTION INTRAVENOUS ONCE
Status: DISCONTINUED | OUTPATIENT
Start: 2025-07-01 | End: 2025-07-01 | Stop reason: HOSPADM

## 2025-07-01 RX ORDER — KETOROLAC TROMETHAMINE 30 MG/ML
INJECTION, SOLUTION INTRAMUSCULAR; INTRAVENOUS AS NEEDED
Status: DISCONTINUED | OUTPATIENT
Start: 2025-07-01 | End: 2025-07-01

## 2025-07-01 RX ORDER — ACETAMINOPHEN 325 MG/1
650 TABLET ORAL EVERY 6 HOURS PRN
COMMUNITY
Start: 2025-07-01 | End: 2025-07-06

## 2025-07-01 RX ORDER — MIDAZOLAM HYDROCHLORIDE 1 MG/ML
INJECTION INTRAMUSCULAR; INTRAVENOUS AS NEEDED
Status: DISCONTINUED | OUTPATIENT
Start: 2025-07-01 | End: 2025-07-01

## 2025-07-01 RX ORDER — CEFAZOLIN 1 G/1
INJECTION, POWDER, FOR SOLUTION INTRAVENOUS AS NEEDED
Status: DISCONTINUED | OUTPATIENT
Start: 2025-07-01 | End: 2025-07-01

## 2025-07-01 RX ORDER — ACETAMINOPHEN 325 MG/1
975 TABLET ORAL ONCE
Status: COMPLETED | OUTPATIENT
Start: 2025-07-01 | End: 2025-07-01

## 2025-07-01 RX ORDER — LIDOCAINE HYDROCHLORIDE 20 MG/ML
INJECTION, SOLUTION INFILTRATION; PERINEURAL AS NEEDED
Status: DISCONTINUED | OUTPATIENT
Start: 2025-07-01 | End: 2025-07-01

## 2025-07-01 RX ORDER — OXYCODONE HYDROCHLORIDE 5 MG/1
5 TABLET ORAL EVERY 4 HOURS PRN
Status: DISCONTINUED | OUTPATIENT
Start: 2025-07-01 | End: 2025-07-01 | Stop reason: HOSPADM

## 2025-07-01 RX ORDER — APREPITANT 40 MG/1
40 CAPSULE ORAL ONCE
Status: DISCONTINUED | OUTPATIENT
Start: 2025-07-01 | End: 2025-07-01 | Stop reason: HOSPADM

## 2025-07-01 RX ORDER — SODIUM CHLORIDE, SODIUM LACTATE, POTASSIUM CHLORIDE, CALCIUM CHLORIDE 600; 310; 30; 20 MG/100ML; MG/100ML; MG/100ML; MG/100ML
INJECTION, SOLUTION INTRAVENOUS CONTINUOUS PRN
Status: DISCONTINUED | OUTPATIENT
Start: 2025-07-01 | End: 2025-07-01

## 2025-07-01 RX ORDER — ONDANSETRON HYDROCHLORIDE 2 MG/ML
4 INJECTION, SOLUTION INTRAVENOUS ONCE AS NEEDED
Status: COMPLETED | OUTPATIENT
Start: 2025-07-01 | End: 2025-07-01

## 2025-07-01 RX ORDER — FENTANYL CITRATE 50 UG/ML
INJECTION, SOLUTION INTRAMUSCULAR; INTRAVENOUS AS NEEDED
Status: DISCONTINUED | OUTPATIENT
Start: 2025-07-01 | End: 2025-07-01

## 2025-07-01 RX ADMIN — LIDOCAINE HYDROCHLORIDE 40 MG: 20 INJECTION, SOLUTION INFILTRATION; PERINEURAL at 09:41

## 2025-07-01 RX ADMIN — ACETAMINOPHEN 975 MG: 325 TABLET, FILM COATED ORAL at 08:37

## 2025-07-01 RX ADMIN — FENTANYL CITRATE 25 MCG: 50 INJECTION, SOLUTION INTRAMUSCULAR; INTRAVENOUS at 09:41

## 2025-07-01 RX ADMIN — FENTANYL CITRATE 25 MCG: 50 INJECTION, SOLUTION INTRAMUSCULAR; INTRAVENOUS at 09:59

## 2025-07-01 RX ADMIN — POVIDONE-IODINE 1 APPLICATION: 5 SOLUTION TOPICAL at 09:12

## 2025-07-01 RX ADMIN — MIDAZOLAM HYDROCHLORIDE 2 MG: 1 INJECTION, SOLUTION INTRAMUSCULAR; INTRAVENOUS at 09:36

## 2025-07-01 RX ADMIN — FENTANYL CITRATE 25 MCG: 0.05 INJECTION, SOLUTION INTRAMUSCULAR; INTRAVENOUS at 11:31

## 2025-07-01 RX ADMIN — DEXAMETHASONE SODIUM PHOSPHATE 4 MG: 4 INJECTION, SOLUTION INTRAMUSCULAR; INTRAVENOUS at 09:51

## 2025-07-01 RX ADMIN — PROPOFOL 140 MG: 10 INJECTION, EMULSION INTRAVENOUS at 09:41

## 2025-07-01 RX ADMIN — FENTANYL CITRATE 50 MCG: 50 INJECTION, SOLUTION INTRAMUSCULAR; INTRAVENOUS at 10:21

## 2025-07-01 RX ADMIN — PROPOFOL 60 MG: 10 INJECTION, EMULSION INTRAVENOUS at 10:21

## 2025-07-01 RX ADMIN — ONDANSETRON 4 MG: 2 INJECTION, SOLUTION INTRAMUSCULAR; INTRAVENOUS at 11:00

## 2025-07-01 RX ADMIN — KETOROLAC TROMETHAMINE 15 MG: 30 INJECTION, SOLUTION INTRAMUSCULAR; INTRAVENOUS at 11:00

## 2025-07-01 RX ADMIN — OXYCODONE 5 MG: 5 TABLET ORAL at 12:05

## 2025-07-01 RX ADMIN — SODIUM CHLORIDE, SODIUM LACTATE, POTASSIUM CHLORIDE, CALCIUM CHLORIDE: 600; 310; 30; 20 INJECTION, SOLUTION INTRAVENOUS at 09:36

## 2025-07-01 RX ADMIN — CELECOXIB 200 MG: 100 CAPSULE ORAL at 08:37

## 2025-07-01 RX ADMIN — ONDANSETRON 4 MG: 2 INJECTION INTRAMUSCULAR; INTRAVENOUS at 11:26

## 2025-07-01 RX ADMIN — CEFAZOLIN 2 G: 330 INJECTION, POWDER, FOR SOLUTION INTRAMUSCULAR; INTRAVENOUS at 09:43

## 2025-07-01 SDOH — HEALTH STABILITY: MENTAL HEALTH: CURRENT SMOKER: 0

## 2025-07-01 ASSESSMENT — PAIN DESCRIPTION - DESCRIPTORS: DESCRIPTORS: ACHING

## 2025-07-01 ASSESSMENT — COLUMBIA-SUICIDE SEVERITY RATING SCALE - C-SSRS
6. HAVE YOU EVER DONE ANYTHING, STARTED TO DO ANYTHING, OR PREPARED TO DO ANYTHING TO END YOUR LIFE?: NO
2. HAVE YOU ACTUALLY HAD ANY THOUGHTS OF KILLING YOURSELF?: NO
1. IN THE PAST MONTH, HAVE YOU WISHED YOU WERE DEAD OR WISHED YOU COULD GO TO SLEEP AND NOT WAKE UP?: NO

## 2025-07-01 ASSESSMENT — PAIN SCALES - GENERAL
PAINLEVEL_OUTOF10: 0 - NO PAIN
PAINLEVEL_OUTOF10: 6
PAINLEVEL_OUTOF10: 6
PAINLEVEL_OUTOF10: 7
PAINLEVEL_OUTOF10: 0 - NO PAIN
PAIN_LEVEL: 0
PAINLEVEL_OUTOF10: 7
PAINLEVEL_OUTOF10: 6
PAINLEVEL_OUTOF10: 5 - MODERATE PAIN

## 2025-07-01 ASSESSMENT — PAIN - FUNCTIONAL ASSESSMENT
PAIN_FUNCTIONAL_ASSESSMENT: 0-10

## 2025-07-01 NOTE — ANESTHESIA PROCEDURE NOTES
Airway  Date/Time: 7/1/2025 9:43 AM  Reason: elective    Airway not difficult    Staffing  Performed: CRNA   Authorized by: MEHNAZ Elmore    Performed by: MEHNAZ Elmore  Patient location during procedure: OR    Patient Condition  Indications for airway management: anesthesia  Patient position: sniffing  MILS maintained throughout  Sedation level: deep     Final Airway Details   Preoxygenated: yes  Final airway type: supraglottic airway  Successful airway:   Size: 4  Number of attempts at approach: 1  Number of other approaches attempted: 0

## 2025-07-01 NOTE — ANESTHESIA POSTPROCEDURE EVALUATION
Patient: Candice Powell    Procedure Summary       Date: 07/01/25 Room / Location: [facility] OR 01 / Virtual [facility] OR    Anesthesia Start: 0936 Anesthesia Stop: 1117    Procedure: PARTIAL MASTECTOMY  BREAST AFTER MAGNETIC SEED LOCALIZATION (Left) Diagnosis:       Malignant neoplasm of upper-inner quadrant of left breast in female, estrogen receptor positive      (Malignant neoplasm of upper-inner quadrant of left breast in female, estrogen receptor positive [C50.212, Z17.0])    Surgeons: Don Roger MD Responsible Provider: MEHNAZ Elmore    Anesthesia Type: general ASA Status: 3            Anesthesia Type: general    Vitals Value Taken Time   /80 07/01/25 11:15   Temp 36.6 °C (97.8 °F) 07/01/25 11:15   Pulse 84 07/01/25 11:15   Resp 17 07/01/25 11:15   SpO2 94 % 07/01/25 11:15       Anesthesia Post Evaluation    Patient location during evaluation: PACU  Patient participation: complete - patient participated  Level of consciousness: awake and alert  Pain score: 0  Pain management: adequate  Multimodal analgesia pain management approach  Airway patency: patent  Two or more strategies used to mitigate risk of obstructive sleep apnea  Cardiovascular status: acceptable and stable  Respiratory status: acceptable and room air  Hydration status: acceptable  Postoperative Nausea and Vomiting: none        There were no known notable events for this encounter.

## 2025-07-01 NOTE — OP NOTE
PARTIAL MASTECTOMY  BREAST AFTER MAGNETIC SEED LOCALIZATION (L) Operative Note     Date: 2025  OR Location: [facility] OR    Name: Candice Powell, : 1955, Age: 69 y.o., MRN: [id number], Sex: female    Diagnosis  Pre-op Diagnosis      * Malignant neoplasm of upper-inner quadrant of left breast in female, estrogen receptor positive [C50.212, Z17.0] Post-op Diagnosis     * Malignant neoplasm of upper-inner quadrant of left breast in female, estrogen receptor positive [C50.212, Z17.0]     Procedures  PARTIAL MASTECTOMY  BREAST AFTER MAGNETIC SEED LOCALIZATION  60037 - PA MASTECTOMY PARTIAL      Surgeons      * Don Roger - Primary    Resident/Fellow/Other Assistant:  Surgeons and Role:  * No surgeons found with a matching role *    Staff:   Circulator: Cheryl  Circulator: Diana Cantu Person: Sarah  Circulator: Chanelle    Anesthesia Staff: CRNA: Erica Weston APRN-CRNA    Procedure Summary  Anesthesia: General  ASA: III  Estimated Blood Loss: 2 mL  Intra-op Medications:   Administrations occurring from 0920 to 1210 on 25:   Medication Name Total Dose   BUPivacaine HCl (Marcaine) 0.5 % (5 mg/mL) 25 mL, lidocaine (Xylocaine) 25 mL syringe 46 mL   ceFAZolin (Ancef) vial 1 g 2 g   dexAMETHasone (Decadron) 4 mg/mL IV Syringe 2 mL 4 mg   fentaNYL (Sublimaze) injection 50 mcg/mL 100 mcg   ketorolac (Toradol) injection 30 mg 15 mg   lactated Ringer's infusion 153.33 mL   lidocaine (Xylocaine) injection 2 % 40 mg   midazolam PF (Versed) injection 1 mg/mL 2 mg   ondansetron (Zofran) 2 mg/mL injection 4 mg   propofol (Diprivan) IV infusion 200 mg              Anesthesia Record               Intraprocedure I/O Totals          Intake    Propofol Drip 0.00 mL    The total shown is the total volume documented since Anesthesia Start was filed.    lactated Ringer's 400.00 mL    Total Intake 400 mL          Specimen:   ID Type Source Tests Collected by Time   1 : Partial Left Mastectomy - suture marks long lateral,  short superior Tissue BREAST LUMPECTOMY LEFT SURGICAL PATHOLOGY EXAM Don Roger MD 7/1/2025 1027   2 : Lateral Margin - suture marks cavity Tissue BREAST MARGIN LEFT SURGICAL PATHOLOGY EXAM Don Roger MD 7/1/2025 0943   3 : Medial Margin - suture marks cavity Tissue BREAST MARGIN LEFT SURGICAL PATHOLOGY EXAM Don Roger MD 7/1/2025 1034   4 : Anterior Margin - suture marks cavity Tissue BREAST MARGIN LEFT SURGICAL PATHOLOGY EXAM Don Roger MD 7/1/2025 1034   5 : Posterior Margin - suture marks cavity Tissue BREAST MARGIN LEFT SURGICAL PATHOLOGY EXAM Don Roger MD 7/1/2025 1036   6 : Superior Margin - suture marks cavity Tissue BREAST MARGIN LEFT SURGICAL PATHOLOGY EXAM Don Roger MD 7/1/2025 1036   7 : Inferior Margin - suture marks cavity Tissue BREAST MARGIN LEFT SURGICAL PATHOLOGY EXAM Don Roger MD 7/1/2025 1036                 Drains and/or Catheters: * None in log *    Tourniquet Times:         Implants:     Findings: The Magseed was identified with the [employer] device after review of the films.  The area on the breast was marked.  This was in the upper breast at approximately 11 o'clock position 11 cm from the nipple.  I discussed with the patient the ease of placing the incision here rather than performing hidden scar operation which the patient agreed with.  The entire mass was excised with the Magseed.  The clip which was posterior to the mass was not visible and likely suction while removing the mass.  There were good gross margins on ultrasound.  The Magseed was identified ex vivo and confirmed to be present on specimen tomography.  Vector margin marker was used to jemal the specimen.  Clips were placed within the breast biopsy cavity.    Indications: Candice Powell is an 69 y.o. female who is having surgery for Malignant neoplasm of upper-inner quadrant of left breast in female, estrogen receptor positive [C50.212, Z17.0].  The patient presented for a second opinion on  the management of her breast cancer.  She had an outside biopsy performed.  She was noted to have a cT1 NX M0 infiltrating ductal carcinoma ER 80 HER2 negative of the left breast.  Based on choosing wisely guidelines it was felt appropriate recommend left Magseed partial mastectomy.  The left axillary ultrasound was negative.    The patient was seen in the preoperative area. The risks, benefits, complications, treatment options, non-operative alternatives, expected recovery and outcomes were discussed with the patient. The possibilities of reaction to medication, pulmonary aspiration, injury to surrounding structures, bleeding, recurrent infection, the need for additional procedures, failure to diagnose a condition, and creating a complication requiring transfusion or operation were discussed with the patient. The patient concurred with the proposed plan, giving informed consent.  The site of surgery was properly noted/marked if necessary per policy. The patient has been actively warmed in preoperative area. Preoperative antibiotics have been ordered and given within 1 hours of incision. Venous thrombosis prophylaxis have been ordered including bilateral sequential compression devices    Procedure Details: After obtaining informed consent with the patient and discussing all the risks which include but not limited to  pain, infection, bleeding, risk of positive margins, need for re-excision, risk of cardiac, pulmonary, neurologic, locomotor, anesthetic events, and other unforeseen complications including death, general anesthesia was induced.  The left breast was interrogated with the [employer]g device.  A jemal was placed over the site of the Magseed.  This was also confirmed with ultrasound though the seed was difficult to identify.  I could identify the tumor.  The left breast was prepped and draped in aseptic fashion an incision was made over the mass dissection was carried down.  Flaps were created superiorly and  inferiorly I carried the flaps out superiorly approximate 3 cm and inferiorly 3 cm.  The Magseed was used to identify the mass.  This was marked with a marking suture.  I then circumferentially excised the mass taking down the pectoralis major muscle.  The entire specimen was excised and interrogated ex vivo.  The seed was identified.  This was marked with a long suture laterally short suture superiorly.  Vector margin marker was used to inked the specimen.  This was sent to radiology which confirmed the findings as above.  The cavity is irrigated copiously.  Breast tissue flaps were created to close over the cavity.  Clips were used to jemal the cavity.  The breast tissue was closed obliterate the cavity.The subcutaneous tissue was closed with 3-0 Vicryl suture.  The skin was closed with 4 Monocryl suture.  Dressings were placed.  All sponge and instrument counts were correct x 2.  The patient tolerated the procedure well and was discharged to the recovery room in stable condition.  Evidence of Infection: No   Complications:  None; patient tolerated the procedure well.    Disposition: PACU - hemodynamically stable.  Condition: stable     The surgical assistant assisted with positioning, preparation of the surgical site, tissue retraction, suctioning, due to the nature of the case and the difficulty of the case.  The surgical assistant performed a primary closure and dressing application.     Additional Details: Time equals 65 minutes.  Wound classification 1.  Body mass index 38    Attending Attestation: I performed the procedure.    Don Roger  Phone Number: [phone]

## 2025-07-01 NOTE — H&P
History Of Present Illness  Candice Powell is a 69 y.o. female presenting for a left magseed partial mastectomy for a [id number] ER 80 / Her 2 negative IDC left breast      Past Medical History  Medical History[1]    Surgical History  Surgical History[2]     Social History  She reports that she has never smoked. She has never used smokeless tobacco. She reports that she does not currently use alcohol. She reports that she does not use drugs.    Family History  Family History[3]     Allergies  Corticosteroids (glucocorticoids), Erythromycin, and Ciprofloxacin    Review of Systems   All other systems reviewed and are negative.       Physical Exam  Constitutional:       Appearance: Normal appearance.   Cardiovascular:      Heart sounds: Normal heart sounds.   Pulmonary:      Breath sounds: Normal breath sounds and air entry.   Abdominal:      General: Abdomen is flat.      Palpations: Abdomen is soft.      Tenderness: There is no abdominal tenderness.   Neurological:      Mental Status: She is alert.          Last Recorded Vitals  Blood pressure 150/67, pulse 72, temperature 36.4 °C (97.5 °F), temperature source Temporal, resp. rate 17, SpO2 100%.         Assessment & Plan  Malignant neoplasm of upper-inner quadrant of left breast in female, estrogen receptor positive      LEFT MAGSEED PARTIAL MASTECTOMY.  Risks include, but not limited to pain, infection, bleeding, risk of positive margins, need for re-excision, risk of cardiac, pulmonary, neurologic, locomotor, anesthetic events, and other unforeseen complications including death.      Don Roger MD         [1]   Past Medical History:  Diagnosis Date    Breast cancer may   [2]   Past Surgical History:  Procedure Laterality Date    BASAL CELL CARCINOMA EXCISION Right     on face    BREAST BIOPSY       SECTION, CLASSIC      HYSTERECTOMY      OOPHORECTOMY     [3]   Family History  Problem Relation Name Age of Onset    Colon cancer Mother  55     Heart disease Father      Colon cancer Sister  55    Lung cancer Sister      Cancer Brother

## 2025-07-03 ASSESSMENT — PAIN SCALES - GENERAL: PAINLEVEL_OUTOF10: 0 - NO PAIN

## 2025-07-14 ENCOUNTER — APPOINTMENT (OUTPATIENT)
Dept: SURGERY | Facility: CLINIC | Age: 70
End: 2025-07-14
Payer: MEDICARE

## 2025-07-14 DIAGNOSIS — C50.212 MALIGNANT NEOPLASM OF UPPER-INNER QUADRANT OF LEFT BREAST IN FEMALE, ESTROGEN RECEPTOR POSITIVE: Primary | ICD-10-CM

## 2025-07-14 DIAGNOSIS — Z17.0 MALIGNANT NEOPLASM OF UPPER-INNER QUADRANT OF LEFT BREAST IN FEMALE, ESTROGEN RECEPTOR POSITIVE: Primary | ICD-10-CM

## 2025-07-14 LAB
LAB AP ASR DISCLAIMER: NORMAL
LAB AP BLOCK FOR ADDITIONAL STUDIES: NORMAL
LABORATORY COMMENT REPORT: NORMAL
PATH REPORT.FINAL DX SPEC: NORMAL
PATH REPORT.GROSS SPEC: NORMAL
PATH REPORT.RELEVANT HX SPEC: NORMAL
PATH REPORT.TOTAL CANCER: NORMAL
PATHOLOGY SYNOPTIC REPORT: NORMAL

## 2025-07-17 ENCOUNTER — TUMOR BOARD CONFERENCE (OUTPATIENT)
Dept: HEMATOLOGY/ONCOLOGY | Facility: HOSPITAL | Age: 70
End: 2025-07-17
Payer: MEDICARE

## 2025-07-17 NOTE — TUMOR BOARD NOTE
MULTIDISCIPLINARY BREAST CANCER TUMOR BOARD CONFERENCE NOTE  Candice Powell was presented at Breast Cancer Tumor Board Conference  Conference date: 7/17/2025  Presenting Provider(s): Dr. Don Roger  Present at Conference: Medical Oncology, Radiation Oncology, Surgical Oncology, Radiology, and Pathology Representatives  Conference Review Type: Pathology Review    National Guidelines discussed: Yes    Surgical Resection: Surgery is complete.S/P Left Magseed partial mastectomy   Radiation therapy: Radiation oncology referral   Genomic Testing: no    Systemic therapy: Recommend endocrine therapy  Clinical Trial Eligible: no    Genetics: meets NCCN criteria, referred    Referral Recommendations:  Genetics    Cancer Staging:  Cancer Staging   Malignant neoplasm of upper-inner quadrant of left breast in female, estrogen receptor positive  Staging form: Breast, AJCC 8th Edition  - Clinical stage from 5/23/2025: cT1, cN0, cM0, ER+, TX-, HER2- - Signed by Don Roger MD on 5/23/2025  - Pathologic stage from 7/14/2025: Stage Unknown (pT1c, pNX, cM0, G3, ER+, TX-, HER2-) - Signed by Don Roger MD on 7/14/2025       Disclaimer  SCC tumor board recommendations represent the consensus opinion of physicians present at a weekly patient care conference. The treating SCC physician is not always present, and many of the physicians formulating the recommendation have not personally seen or examined the patient under discussion. It is understood that the treating [facility] physician considers the expertise of the Tumor Board Recommendation in formulating his/her plan for the patient. However, in many situations, based on individualized patient considerations, a different plan is determined by the treating physician to be the optimal medical management.    Scribe Attestation  By signing my name below, Ike FROST Scribe   attest that this documentation has been prepared under the direction and in the presence of BREAST TUMOR  BOARD.

## 2025-07-18 ENCOUNTER — OFFICE VISIT (OUTPATIENT)
Dept: SURGERY | Facility: CLINIC | Age: 70
End: 2025-07-18
Payer: MEDICARE

## 2025-07-18 VITALS
DIASTOLIC BLOOD PRESSURE: 79 MMHG | BODY MASS INDEX: 36.8 KG/M2 | WEIGHT: 229 LBS | TEMPERATURE: 97.6 F | SYSTOLIC BLOOD PRESSURE: 168 MMHG | HEART RATE: 65 BPM | HEIGHT: 66 IN

## 2025-07-18 DIAGNOSIS — Z17.0 MALIGNANT NEOPLASM OF UPPER-INNER QUADRANT OF LEFT BREAST IN FEMALE, ESTROGEN RECEPTOR POSITIVE: ICD-10-CM

## 2025-07-18 DIAGNOSIS — C50.212 MALIGNANT NEOPLASM OF UPPER-INNER QUADRANT OF LEFT BREAST IN FEMALE, ESTROGEN RECEPTOR POSITIVE: ICD-10-CM

## 2025-07-18 DIAGNOSIS — Z17.0 MALIGNANT NEOPLASM OF UPPER-INNER QUADRANT OF LEFT BREAST IN FEMALE, ESTROGEN RECEPTOR POSITIVE: Primary | ICD-10-CM

## 2025-07-18 DIAGNOSIS — Z90.12 STATUS POST PARTIAL MASTECTOMY OF LEFT BREAST: ICD-10-CM

## 2025-07-18 DIAGNOSIS — C50.212 MALIGNANT NEOPLASM OF UPPER-INNER QUADRANT OF LEFT BREAST IN FEMALE, ESTROGEN RECEPTOR POSITIVE: Primary | ICD-10-CM

## 2025-07-18 PROCEDURE — 1160F RVW MEDS BY RX/DR IN RCRD: CPT | Performed by: SURGERY

## 2025-07-18 PROCEDURE — 1036F TOBACCO NON-USER: CPT | Performed by: SURGERY

## 2025-07-18 PROCEDURE — 1159F MED LIST DOCD IN RCRD: CPT | Performed by: SURGERY

## 2025-07-18 PROCEDURE — 99024 POSTOP FOLLOW-UP VISIT: CPT | Performed by: SURGERY

## 2025-07-18 PROCEDURE — 3008F BODY MASS INDEX DOCD: CPT | Performed by: SURGERY

## 2025-07-18 NOTE — Clinical Note
Luis Alfredo -coming to see you on July 22.  Is a little reticent about radiation.  I discussed the importance of this.  Hopefully you can give her an abbreviated course. Karyna -coming to see you on July 23.  Tumor board recommendations made for Oncotype because of the OR 0 status which might confer a luminal B pathology.  She is not keen to have any form of chemotherapy so I am not sure that an Oncotype would be of any benefit.

## 2025-07-18 NOTE — PROGRESS NOTES
Patient ID: Candice Powell is a 69 y.o. female.  Following up after left Magseed partial mastectomy for pT1 cNX M0 infiltrating ductal cancer ER 80 [place] negative HER2 negative cancer.  Was presented at the [facility] cancer tumor board July 17, 2025.  Recommendations made for adjuvant radiation therapy.  Medical oncology referral.  Because of the KS 0 status I discussion was held regarding Oncotype as she would be considered luminal B.  Genetics referral also discussed  Patient is doing well.    Objective   Physical Exam  Chest:      Comments: Left breast with scar upper breast.  Nicely healed.  No ecchymosis.        Problem List Items Addressed This Visit       Malignant neoplasm of upper-inner quadrant of left breast in female, estrogen receptor positive - Primary    Status post partial mastectomy of left breast        Assessment/Plan   Status post left Magseed partial mastectomy for pT1 cNX M0 infiltrating ductal cancer ER 80 KS negative HER2 negative breast cancer.  Recommendations made for adjuvant radiation therapy, medical oncology referral, Oncotype based on KS 0 status, and genetics referral.  Plan-I discussed the [facility] cancer recommendations with the patient.  She will be seen by medical oncology and radiation oncology and genetics..  She will follow-up with me in 3 months.

## 2025-07-22 ENCOUNTER — HOSPITAL ENCOUNTER (OUTPATIENT)
Dept: RADIATION ONCOLOGY | Facility: CLINIC | Age: 70
Setting detail: RADIATION/ONCOLOGY SERIES
Discharge: HOME | End: 2025-07-22
Payer: MEDICARE

## 2025-07-22 VITALS
SYSTOLIC BLOOD PRESSURE: 156 MMHG | HEART RATE: 64 BPM | RESPIRATION RATE: 18 BRPM | TEMPERATURE: 97.2 F | OXYGEN SATURATION: 98 % | WEIGHT: 228.07 LBS | BODY MASS INDEX: 36.81 KG/M2 | DIASTOLIC BLOOD PRESSURE: 87 MMHG

## 2025-07-22 DIAGNOSIS — Z17.0 MALIGNANT NEOPLASM OF UPPER-INNER QUADRANT OF LEFT BREAST IN FEMALE, ESTROGEN RECEPTOR POSITIVE: Primary | ICD-10-CM

## 2025-07-22 DIAGNOSIS — C50.212 MALIGNANT NEOPLASM OF UPPER-INNER QUADRANT OF LEFT BREAST IN FEMALE, ESTROGEN RECEPTOR POSITIVE: Primary | ICD-10-CM

## 2025-07-22 PROCEDURE — 99205 OFFICE O/P NEW HI 60 MIN: CPT | Performed by: STUDENT IN AN ORGANIZED HEALTH CARE EDUCATION/TRAINING PROGRAM

## 2025-07-22 PROCEDURE — 99215 OFFICE O/P EST HI 40 MIN: CPT | Performed by: STUDENT IN AN ORGANIZED HEALTH CARE EDUCATION/TRAINING PROGRAM

## 2025-07-22 SDOH — ECONOMIC STABILITY: TRANSPORTATION INSECURITY
IN THE PAST 12 MONTHS, HAS THE LACK OF TRANSPORTATION KEPT YOU FROM MEDICAL APPOINTMENTS OR FROM GETTING MEDICATIONS?: NO

## 2025-07-22 SDOH — ECONOMIC STABILITY: FOOD INSECURITY: WITHIN THE PAST 12 MONTHS, THE FOOD YOU BOUGHT JUST DIDN'T LAST AND YOU DIDN'T HAVE MONEY TO GET MORE.: NEVER TRUE

## 2025-07-22 SDOH — ECONOMIC STABILITY: TRANSPORTATION INSECURITY
IN THE PAST 12 MONTHS, HAS LACK OF TRANSPORTATION KEPT YOU FROM MEETINGS, WORK, OR FROM GETTING THINGS NEEDED FOR DAILY LIVING?: NO

## 2025-07-22 SDOH — ECONOMIC STABILITY: FOOD INSECURITY: WITHIN THE PAST 12 MONTHS, YOU WORRIED THAT YOUR FOOD WOULD RUN OUT BEFORE YOU GOT MONEY TO BUY MORE.: NEVER TRUE

## 2025-07-22 SDOH — ECONOMIC STABILITY: INCOME INSECURITY: IN THE LAST 12 MONTHS, WAS THERE A TIME WHEN YOU WERE NOT ABLE TO PAY THE MORTGAGE OR RENT ON TIME?: NO

## 2025-07-22 ASSESSMENT — ENCOUNTER SYMPTOMS
NEUROLOGICAL NEGATIVE: 1
NERVOUS/ANXIOUS: 1
BACK PAIN: 1
OCCASIONAL FEELINGS OF UNSTEADINESS: 0
LOSS OF SENSATION IN FEET: 0
DEPRESSION: 0
EYES NEGATIVE: 1
HEMATOLOGIC/LYMPHATIC NEGATIVE: 1
DIARRHEA: 1
RESPIRATORY NEGATIVE: 1
CONSTIPATION: 1
NECK PAIN: 1
PALPITATIONS: 1
FATIGUE: 1
ENDOCRINE NEGATIVE: 1

## 2025-07-22 ASSESSMENT — PATIENT HEALTH QUESTIONNAIRE - PHQ9: 1. LITTLE INTEREST OR PLEASURE IN DOING THINGS: NOT AT ALL

## 2025-07-22 ASSESSMENT — PAIN SCALES - GENERAL: PAINLEVEL_OUTOF10: 0-NO PAIN

## 2025-07-22 ASSESSMENT — LIFESTYLE VARIABLES
AUDIT-C TOTAL SCORE: 0
HOW OFTEN DO YOU HAVE A DRINK CONTAINING ALCOHOL: NEVER
SKIP TO QUESTIONS 9-10: 1
HOW MANY STANDARD DRINKS CONTAINING ALCOHOL DO YOU HAVE ON A TYPICAL DAY: PATIENT DOES NOT DRINK
HOW OFTEN DO YOU HAVE SIX OR MORE DRINKS ON ONE OCCASION: NEVER

## 2025-07-22 ASSESSMENT — SOCIAL DETERMINANTS OF HEALTH (SDOH): HOW HARD IS IT FOR YOU TO PAY FOR THE VERY BASICS LIKE FOOD, HOUSING, MEDICAL CARE, AND HEATING?: NOT VERY HARD

## 2025-07-22 NOTE — PROGRESS NOTES
Radiation Oncology Outpatient Consult    Patient Name:  Candice Powell  MRN:  [id number]  :  1955    Referring Provider: Don Roger MD  Primary Care Provider: Que Craft MD  Care Team: Patient Care Team:  Que Craft MD as PCP - General (Family Medicine)  Luis Alfredo Pinto MD as Radiation Oncologist (Radiation Oncology)    Date of Service: 2025     SUBJECTIVE  History of Present Illness:  Candice Powell is a 69 y.o. female who was referred by Don Roger MD, for a consultation to the [facility] Department of Radiation Oncology.  She is presenting for evaluation and management of early leftbreast cancer, hormone positive, HER2 negative.     #) Left breast, invasive ductal carcinoma, pT1c pNx (cN0), ER +80%, KS negative, HER2 equivocal (2+)/negative on dual JOLENE (1.6), measuring 1.4 cm, G2, no evidence of lymphovascular invasion, no evidence of DCIS.  Margins greater than 2 mm from invasive carcinoma, status post left partial mastectomy.  #) FIGO stage IA, Endometrioid type, adenocarcinoma of the uterus, pT1a pNx, FIGO grade 1, MMRp s/p JOI/BSO    Ms. Powell is a postmenopausal female that presented for initial screening mammogram in 2025.  The patient had bilateral screening mammogram with tomosynthesis on 2025 which revealed a focal asymmetry in the left breast in the upper outer quadrant, posterior depth.  No evidence of calcifications, suspicious masses in the right breast.  The patient was further evaluated with left diagnostic mammogram and tomosynthesis with ultrasound on 2025 which revealed an irregular focus of asymmetry in the left upper breast, outer quadrant at posterior depth which persists despite compression imaging.  Targeted ultrasound revealed at 12:00, 11 cm from the nipple a 1.4 cm irregular hypoechoic lesion the patient underwent left breast core biopsy on 2025 which showed invasive ductal carcinoma, ER  +80%, LA negative, HER2 equivocal on IHC and negative on dual JOLENE.    The patient underwent left breast localized partial mastectomy on 2025 which revealed a single focus of invasive ductal carcinoma, measuring 1.4 cm, G2, no evidence of lymphovascular invasion, no evidence of DCIS.  Margins greater than 2 mm from invasive carcinoma.      Onset of menses - 13  Onset of menopause - Mid 50s  Post-menopausal bleeding - None  OCP use: None  Estrogen replacement: None    Prior Radiotherapy:  No radiation treatments to show. (Treatments may have been administered in another system.)       Past Medical History:  Medical History[1]     Past Surgical History:  Surgical History[2]     Family History:  Cancer-related family history includes Cancer in her brother; Colon cancer (age of onset: 55) in her mother and sister; Lung cancer in her sister.    Social History:  Social History[3]    Allergies:  Allergies[4]     Medications:  Current Medications[5]      Review of Systems:  Review of Systems   Constitutional:  Positive for fatigue.   HENT:   Positive for hearing loss and tinnitus.    Eyes: Negative.    Respiratory: Negative.     Cardiovascular:  Positive for palpitations.   Gastrointestinal:  Positive for constipation and diarrhea.   Endocrine: Negative.    Genitourinary: Negative.     Musculoskeletal:  Positive for back pain and neck pain.   Skin: Negative.    Neurological: Negative.    Hematological: Negative.    Psychiatric/Behavioral:  The patient is nervous/anxious.        Performance Status:  The Karnofsky performance scale today is 90, Able to carry on normal activity; minor signs or symptoms of disease (ECOG equivalent 0).        OBJECTIVE  There were no vitals taken for this visit.   Physical Exam  Vitals and nursing note reviewed. Exam conducted with a chaperone present.     Eyes:      Extraocular Movements: Extraocular movements intact.       Cardiovascular:      Rate and Rhythm: Normal rate and regular  rhythm.   Pulmonary:      Effort: Pulmonary effort is normal.   Chest:   Breasts:     Right: Normal. No swelling, mass, nipple discharge or skin change.      Left: Skin change (Intact left lumpectomy incision) present. No swelling, mass or nipple discharge.       Musculoskeletal:         General: Normal range of motion.      Right upper arm: No edema.      Left upper arm: No edema.      Right forearm: No edema.      Left forearm: No edema.      Right lower leg: No edema.      Left lower leg: No edema.   Lymphadenopathy:      Upper Body:      Right upper body: No supraclavicular or axillary adenopathy.      Left upper body: No supraclavicular or axillary adenopathy.     Neurological:      Mental Status: She is alert.          Laboratory Review:  There are no laboratory contraindications to radiation therapy.    The pertinent lab results were reviewed and discussed with the patient.  Notably,       None    Pathology Review:  The pertinent pathology results were reviewed and discussed with the patient.  Notably,     7/1/2025-A. Left breast, magseed localized partial mastectomy:  -- Invasive ductal carcinoma, single focus of invasive ductal carcinoma, measuring 1.4 cm, G2, no evidence of lymphovascular invasion, no evidence of DCIS.  Margins greater than 2 mm from invasive carcinoma.  -- Previous biopsy site changes, Note: Previous biopsy site changes are located at the inked superior aspect of this specimen.      B. Left breast margin, lateral, excision:-- Negative for carcinoma.Note: Immunohistochemical stain for keratin AE1/AE3 is negative.     C. Left breast margin, medial, excision:-- Negative for carcinoma. Note: Immunohistochemical stain for keratin AE1/AE3 is negative.     D. Left breast margin, anterior, excision:-- Negative for carcinoma. Note: Immunohistochemical stain for keratin AE1/AE3 is negative.     E. Left breast margin, posterior, excision:-- Negative for carcinoma. Note: Immunohistochemical stain for  keratin AE1/AE3 is negative.     F. Left breast margin, superior, excision:-- Negative for carcinoma. Note: Immunohistochemical stain for keratin AE1/AE3 is negative.     G. Left breast margin, inferior, excision-- Negative for carcinoma. Note: Immunohistochemical stain for keratin AE1/AE3 is negative.  pT1c pNx    4/30/2025-A.  Left breast, core biopsy: Consistent with invasive ductal carcinoma.  Comment: The needle biopsy shows the malignant infiltrate in a single file pattern or small solid nests suspicious for infiltrating lobular carcinoma.  An immunostain for E-cadherin was performed.  The tumor cells stain strongly positive for E-cadherin..  The findings are consistent with invasive ductal carcinoma.  An intradepartmental consultation was obtained.  Breast marker profile studies (ER, MO and HER2) are being performed at [facility].  The results can be viewed in EPIC when they become available.    5/18/2020-A.  UTERUS, CERVIX, FALLOPIAN TUBES, OVARIES (FS A1):    -- ENDOMETRIAL CARCINOMA, ENDOMETRIOID TYPE, SUPERFICIALLY INVASIVE (18%), FIGO  GRADE 1, SEE NOTE AND CANCER SUMMARY REPORT.  -- ENDOMETRIAL POLYP.  -- MYOMETRIUM: DIFFUSE ADENOMYOSIS, MULTIPLE LEIOMYOMAS.  -- CERVIX: NABOTHIAN CYSTS.  -- FALLOPIAN TUBES: PARATUBAL CYSTS.  -- OVARIES: CORTICAL INCLUSION CYSTS.  pT1a pNx    Disease Associated Genomics:  Breast:  ER +80%, MO negative, HER2 equivocal (2+)/negative on dual JOLENE (1.6)   Oncotype DX score: Pending vs deferred     Uterine:  MMR-proficient  MLH-1 Positive   PMS-2  Positive   MSH-2  Positive   MSH-6  Positive     Imaging:  The pertinent imaging results were reviewed and discussed with the patient.  Notably,       4/23/2025-left diagnostic mammogram and tomosynthesis with ultrasound: Scattered areas of fibroglandular tissue.  Irregular focal asymmetry in the left upper outer breast, posterior depth with persists with compression imaging.  Targeted ultrasound revealed an irregular hypoechoic  lesion at 12:00, 11 cm from the nipple with posterior acoustic shadowing measuring 1.4 cm.    2/24/2025-bilateral screening mammogram with tomosynthesis: Scattered areas of fibroglandular tissue.  Focal asymmetry in the left breast in the upper outer quadrant, posterior depth.  No evidence of suspicious masses, calcifications or lesions in the right breast.       Prior Systemic Therapies:  None    Prior Surgeries:  7/1/2025-left partial mastectomy    Prior Radiation Therapy:  None    ASSESSMENT:   Candice Powell is a 69 y.o. female with Malignant neoplasm of upper-inner quadrant of left breast in female, estrogen receptor positive, Clinical: cT1, cN0, cM0, ER+, ND-, HER2-  Malignant neoplasm of upper-inner quadrant of left breast in female, estrogen receptor positive, Pathologic: Stage Unknown (pT1c, pNX, cM0, G3, ER+, ND-, HER2-).      Ms. Powell is a postmenopausal female with left breast, invasive ductal carcinoma, pT1c pNx (cN0), ER +80%, ND negative, HER2 equivocal (2+)/negative on dual JOLENE (1.6), measuring 1.4 cm, G2, no evidence of lymphovascular invasion, no evidence of DCIS.  Margins greater than 2 mm from invasive carcinoma, status post left partial mastectomy.    I discussed with the patient regarding her clinical presentation, pathology, imaging and treatment recommendations for hormone positive, HER2 negative early breast cancer.  Given the patient's intermediate grade, size less than 2 cm, age greater than 70 the patient may be a candidate for endocrine blocking therapy alone, I did discuss the benefit of postoperative radiation therapy at decreasing ipsilateral breast tumor recurrence.    I discussed with the patient treatment options including partial breast radiation therapy, whole breast radiation therapy in the form of ultra-hypofractionated, hypofractionated and conventional radiation therapy.  I discussed with the patient regarding the benefits, risks, long-term toxicities of radiation therapy to  the left breast.  The patient had all questions and concerns during the visit.  Contact information was given to the patient.      The patient will meet with medical oncology to discuss endocrine blocking therapy, if the patient desires radiation therapy she has our contact information to initiate follow-up and CT simulation.    PLAN:     #) Left breast, invasive ductal carcinoma, pT1c pNx (cN0), ER +80%, NV negative, HER2 equivocal (2+)/negative on dual JOLENE (1.6), measuring 1.4 cm, G2, no evidence of lymphovascular invasion, no evidence of DCIS.  Margins greater than 2 mm from invasive carcinoma, status post left partial mastectomy.  -Contact information given to the clinic, patient will contact if desiring proceeding with radiation therapy  -Scheduled to see medical oncology for discussion of endocrine blocking therapy  - Discussed with the patient regarding treatment options including endocrine blocking therapy alone, partial breast radiation therapy or whole breast radiation therapy with endocrine blocking therapy  - Status post left partial mastectomy    #) FIGO stage IA, Endometrioid type, adenocarcinoma of the uterus, pT1a pNx, FIGO grade 1, MMRp s/p JOI/BSO  -AVELINO  - Status post JOI/BSO    NCCN Guidelines were applicable to guide this patients treatment plan.     A total of 45 minutes were spent face-to-face with the patient, the majority of time spent detailing treatment options with an additional 15 minutes spent reviewing records including imaging, pathology and physician notes.    Luis Alrfedo Pinto MD  Critical access hospital/[facility]  [place] clinical  - Department of Radiation Oncology  Phone: [phone]  Fax: [phone]  [facility] secure chat preferred / Pager [phone]    Note: This was transcribed using Dragon voice recognition software. Attempts were made to correct any errors; however, errors or omissions may be present.             [1]   Past Medical History:  Diagnosis Date     Breast cancer may   [2]   Past Surgical History:  Procedure Laterality Date    BASAL CELL CARCINOMA EXCISION Right     on face    BREAST BIOPSY      BREAST LUMPECTOMY Left 2025     SECTION, CLASSIC      HYSTERECTOMY      OOPHORECTOMY     [3]   Social History  Tobacco Use    Smoking status: Never    Smokeless tobacco: Never   Vaping Use    Vaping status: Never Used   Substance Use Topics    Alcohol use: Not Currently    Drug use: Never   [4]   Allergies  Allergen Reactions    Corticosteroids (Glucocorticoids) Other     Patient states she has 'bad thoughts' when taking steroids.    Erythromycin GI Upset    Ciprofloxacin Itching and Rash   [5]   Current Outpatient Medications:     cyanocobalamin (Vitamin B-12) 100 mcg tablet, Take 1 tablet (100 mcg) by mouth once daily., Disp: , Rfl:     ergocalciferol (Vitamin D-2) 1250 mcg (50,000 units) capsule, Take 1 capsule (1.25 mg) by mouth 1 (one) time per week., Disp: , Rfl:     MAGNESIUM GLUCONATE ORAL, Take 200 mg by mouth 1 time., Disp: , Rfl:

## 2025-07-22 NOTE — PROGRESS NOTES
Radiation Oncology Nursing Note    Prior Radiotherapy:  No  No radiation treatments to show. (Treatments may have been administered in another system.)     Current Systemic Treatment:  No     Presence of Pacemaker or ICD:  No    History of Autoimmune or Connective Tissue Disorders:  No    Pain: The patient's current pain level was assessed.  They report currently having a pain of 0 out of 10.  They feel their pain is under control without the use of pain medications.    Review of Systems:  Review of Systems   Constitutional:  Positive for fatigue.   HENT:   Positive for hearing loss and tinnitus.    Eyes: Negative.    Respiratory: Negative.     Cardiovascular:  Positive for palpitations.   Gastrointestinal:  Positive for constipation and diarrhea.   Endocrine: Negative.    Genitourinary: Negative.     Musculoskeletal:  Positive for back pain and neck pain.   Skin: Negative.    Neurological: Negative.    Hematological: Negative.    Psychiatric/Behavioral:  The patient is nervous/anxious.    Education Documentation  Radiation Therapy (External Beam) : What Is Radiation Therapy, taught by Mary Meng RN at 7/22/2025 11:41 AM.  Learner: Significant Other, Patient  Readiness: Acceptance  Method: Explanation  Response: Verbalizes Understanding    Radiation Therapy (External Beam) : Side Effects, taught by Mary Meng RN at 7/22/2025 11:41 AM.  Learner: Significant Other, Patient  Readiness: Acceptance  Method: Explanation  Response: Verbalizes Understanding    Radiation Therapy (External Beam) : Review, taught by Mary Meng RN at 7/22/2025 11:41 AM.  Learner: Significant Other, Patient  Readiness: Acceptance  Method: Explanation  Response: Verbalizes Understanding    Radiation Therapy (External Beam) : Life During Treatment, taught by Mary Meng RN at 7/22/2025 11:41 AM.  Learner: Significant Other, Patient  Readiness: Acceptance  Method: Explanation  Response: Verbalizes Understanding    Radiation Therapy  (External Beam) : Getting Radiation, taught by Mary Meng RN at 7/22/2025 11:41 AM.  Learner: Significant Other, Patient  Readiness: Acceptance  Method: Explanation  Response: Verbalizes Understanding    Treatment Plan and Schedule, taught by Mary Meng RN at 7/22/2025 10:54 AM.  Learner: Significant Other, Patient  Readiness: Acceptance  Method: Explanation  Response: Verbalizes Understanding    Education Comments  07/22/25 1141 Mary Meng RN  Patient given information on radiation to the breast but not reviewed. Patient to call with decision. Patient verbalizes understanding with verbal teach back. Mary Meng MSN, RN, OCN    07/22/25 1055 Mary Meng RN  New patient here today to see Dr. Pinto for left breast invasive ductal carcinoma, (-) margins, grade 2, ER (+) 80%, NJ (-), Her2 (-). Patient states that she still has a hard knot in the breast.

## 2025-07-23 ENCOUNTER — OFFICE VISIT (OUTPATIENT)
Dept: HEMATOLOGY/ONCOLOGY | Facility: HOSPITAL | Age: 70
End: 2025-07-23
Payer: MEDICARE

## 2025-07-23 VITALS
DIASTOLIC BLOOD PRESSURE: 80 MMHG | RESPIRATION RATE: 16 BRPM | HEIGHT: 66 IN | OXYGEN SATURATION: 97 % | BODY MASS INDEX: 36.48 KG/M2 | SYSTOLIC BLOOD PRESSURE: 153 MMHG | HEART RATE: 70 BPM | WEIGHT: 226.96 LBS | TEMPERATURE: 96.8 F

## 2025-07-23 DIAGNOSIS — Z17.0 MALIGNANT NEOPLASM OF UPPER-INNER QUADRANT OF LEFT BREAST IN FEMALE, ESTROGEN RECEPTOR POSITIVE: Primary | ICD-10-CM

## 2025-07-23 DIAGNOSIS — C50.212 MALIGNANT NEOPLASM OF UPPER-INNER QUADRANT OF LEFT BREAST IN FEMALE, ESTROGEN RECEPTOR POSITIVE: Primary | ICD-10-CM

## 2025-07-23 PROCEDURE — 99205 OFFICE O/P NEW HI 60 MIN: CPT | Performed by: INTERNAL MEDICINE

## 2025-07-23 PROCEDURE — 99215 OFFICE O/P EST HI 40 MIN: CPT | Performed by: INTERNAL MEDICINE

## 2025-07-23 PROCEDURE — 1159F MED LIST DOCD IN RCRD: CPT | Performed by: INTERNAL MEDICINE

## 2025-07-23 PROCEDURE — 3008F BODY MASS INDEX DOCD: CPT | Performed by: INTERNAL MEDICINE

## 2025-07-23 PROCEDURE — 1126F AMNT PAIN NOTED NONE PRSNT: CPT | Performed by: INTERNAL MEDICINE

## 2025-07-23 RX ORDER — LETROZOLE 2.5 MG/1
2.5 TABLET, FILM COATED ORAL DAILY
Qty: 60 TABLET | Refills: 5 | Status: SHIPPED | OUTPATIENT
Start: 2025-07-23 | End: 2026-07-23

## 2025-07-23 SDOH — ECONOMIC STABILITY: GENERAL
WHICH OF THE FOLLOWING DO YOU KNOW HOW TO USE AND HAVE ACCESS TO EVERY DAY? (CHOOSE ALL THAT APPLY): SMARTPHONE WITH CELLULAR DATA PLAN

## 2025-07-23 SDOH — HEALTH STABILITY: PHYSICAL HEALTH: ON AVERAGE, HOW MANY DAYS PER WEEK DO YOU ENGAGE IN MODERATE TO STRENUOUS EXERCISE (LIKE A BRISK WALK)?: 2 DAYS

## 2025-07-23 SDOH — HEALTH STABILITY: PHYSICAL HEALTH: ON AVERAGE, HOW MANY MINUTES DO YOU ENGAGE IN EXERCISE AT THIS LEVEL?: 30 MIN

## 2025-07-23 SDOH — ECONOMIC STABILITY: GENERAL
WHICH OF THE FOLLOWING WOULD YOU LIKE TO GET CONNECTED TO IN ORDER TO RECEIVE A DISCOUNT OR FOR FREE? (CHOOSE ALL THAT APPLY): NO ASSISTANCE NEEDED

## 2025-07-23 ASSESSMENT — SOCIAL DETERMINANTS OF HEALTH (SDOH)
HOW OFTEN DO YOU GET TOGETHER WITH FRIENDS OR RELATIVES?: NEVER
HOW OFTEN DO YOU ATTEND CHURCH OR RELIGIOUS SERVICES?: 1 TO 4 TIMES PER YEAR
HOW OFTEN DO YOU ATTENT MEETINGS OF THE CLUB OR ORGANIZATION YOU BELONG TO?: NEVER
IN A TYPICAL WEEK, HOW MANY TIMES DO YOU TALK ON THE PHONE WITH FAMILY, FRIENDS, OR NEIGHBORS?: MORE THAN THREE TIMES A WEEK
DO YOU BELONG TO ANY CLUBS OR ORGANIZATIONS SUCH AS CHURCH GROUPS UNIONS, FRATERNAL OR ATHLETIC GROUPS, OR SCHOOL GROUPS?: NO
IN THE PAST 12 MONTHS, HAS THE ELECTRIC, GAS, OIL, OR WATER COMPANY THREATENED TO SHUT OFF SERVICE IN YOUR HOME?: NO

## 2025-07-23 ASSESSMENT — PATIENT HEALTH QUESTIONNAIRE - PHQ9
1. LITTLE INTEREST OR PLEASURE IN DOING THINGS: NOT AT ALL
SUM OF ALL RESPONSES TO PHQ9 QUESTIONS 1 AND 2: 0
2. FEELING DOWN, DEPRESSED OR HOPELESS: NOT AT ALL

## 2025-07-23 ASSESSMENT — COLUMBIA-SUICIDE SEVERITY RATING SCALE - C-SSRS
2. HAVE YOU ACTUALLY HAD ANY THOUGHTS OF KILLING YOURSELF?: NO
1. IN THE PAST MONTH, HAVE YOU WISHED YOU WERE DEAD OR WISHED YOU COULD GO TO SLEEP AND NOT WAKE UP?: NO
6. HAVE YOU EVER DONE ANYTHING, STARTED TO DO ANYTHING, OR PREPARED TO DO ANYTHING TO END YOUR LIFE?: NO

## 2025-07-23 ASSESSMENT — PAIN SCALES - GENERAL: PAINLEVEL_OUTOF10: 0-NO PAIN

## 2025-07-23 ASSESSMENT — ENCOUNTER SYMPTOMS
OCCASIONAL FEELINGS OF UNSTEADINESS: 0
CONSTITUTIONAL NEGATIVE: 1
DEPRESSION: 0
CARDIOVASCULAR NEGATIVE: 1
RESPIRATORY NEGATIVE: 1
LOSS OF SENSATION IN FEET: 0
GASTROINTESTINAL NEGATIVE: 1

## 2025-07-24 NOTE — PROGRESS NOTES
"Patient ID: Candice Powell is a 69 y.o. female.  Referring Physician: Don Roger MD  [address] [facility] [address], [address]  [place] [address]  Primary Care Provider: Que Craft MD  Referral Reason: Breast cancer    Subjective:  Found to have a L breast mass on screening mammogram => Bx showed cancer. Underwent lumpectomy in Jul 2025. Here to discuss adjuvant treatment.   Feels well. Surgical site healed well.     Had had T1 endometrial cancer years ago. S/p lamar/bso    Heme/Onc History:  - Found to have a L breast mass on screening MMG => Bx showed breast cancer  - Lumpectomy without LND (Jul 2025): 14 mm, grade 2 IDCA. Margins clear. pT1c. ER 80%. Her2 neg.     Assessment/Plan:  ? Breast cancer: s/p lumpectomy in Jul 2025. ER 80% positive. Her2 neg. Needs endocrine therapy. Adjuvant chemotherapy is unlikely to benefit her at this age with strong ER positivity and she does not want it either.   In fact, she is very resistant to get any treatment at this time. Worried about side effects of endocrine therapy and radiation. Seems like she will not take any of it.   Still, I talked to her about aromatase inhibitors in detail including their side effects. I wrote her a prescription for letrozole and asked her to start taking it after radiation if she goes for radiation. If not, she can start it right now. She seems to understand what is at risk here. She understands that if cancer is to recur, we may not cure it then.   I will see her back in 3 months.     Review Of Systems:  Review of Systems   Constitutional: Negative.    HENT:  Negative.     Respiratory: Negative.     Cardiovascular: Negative.    Gastrointestinal: Negative.        Physical Exam:  /80 (BP Location: Right arm, Patient Position: Sitting, BP Cuff Size: Adult)   Pulse 70   Temp 36 °C (96.8 °F) (Temporal)   Resp 16   Ht 1.67 m (5' 5.75\")   Wt 103 kg (226 lb 15.4 oz)   SpO2 97%   BMI 36.91 kg/m²   BSA: 2.19 meters " squared  Performance Status: Asymptomatic  Physical Exam  HENT:      Head: Normocephalic and atraumatic.     Eyes:      General: No scleral icterus.    Pulmonary:      Effort: Pulmonary effort is normal.     Musculoskeletal:         General: Normal range of motion.     Skin:     Coloration: Skin is not jaundiced.     Neurological:      General: No focal deficit present.      Mental Status: She is alert and oriented to person, place, and time.         Results:  Diagnostic Results   Lab Results   Component Value Date    WBC 13.0 (H) 05/19/2020    HGB 13.2 05/19/2020    HCT 39.8 05/19/2020    MCV 87 05/19/2020     05/19/2020     Lab Results   Component Value Date    CALCIUM 9.0 05/19/2020     05/19/2020    K 4.1 05/19/2020    CO2 28 05/19/2020     05/19/2020    BUN 16 05/19/2020    CREATININE 0.79 05/19/2020     Current Medications[1]     Surgical History[2]  Family History[3]   reports that she has never smoked. She has never used smokeless tobacco.  Social History     Socioeconomic History    Marital status:      Spouse name: Not on file    Number of children: Not on file    Years of education: Not on file    Highest education level: Not on file   Occupational History    Not on file   Tobacco Use    Smoking status: Never    Smokeless tobacco: Never   Vaping Use    Vaping status: Never Used   Substance and Sexual Activity    Alcohol use: Not Currently    Drug use: Never    Sexual activity: Yes   Other Topics Concern    Not on file   Social History Narrative    Not on file     Social Drivers of Health     Financial Resource Strain: Low Risk  (7/22/2025)    Overall Financial Resource Strain (CARDIA)     Difficulty of Paying Living Expenses: Not very hard   Food Insecurity: No Food Insecurity (7/22/2025)    Hunger Vital Sign     Worried About Running Out of Food in the Last Year: Never true     Ran Out of Food in the Last Year: Never true   Transportation Needs: No Transportation Needs  (7/22/2025)    PRAPARE - Transportation     Lack of Transportation (Medical): No     Lack of Transportation (Non-Medical): No   Physical Activity: Insufficiently Active (7/23/2025)    Exercise Vital Sign     Days of Exercise per Week: 2 days     Minutes of Exercise per Session: 30 min   Stress: No Stress Concern Present (7/22/2025)    [employer] [place] [employer] - Occupational Stress Questionnaire     Feeling of Stress: Only a little   Recent Concern: Stress - Stress Concern Present (7/22/2025)    [place] [place] [employer] - Occupational Stress Questionnaire     Feeling of Stress: To some extent   Social Connections: Moderately Integrated (7/23/2025)    Social Connection and Isolation Panel     Frequency of Communication with Friends and Family: More than three times a week     Frequency of Social Gatherings with Friends and Family: Never     Attends [employer] Services: 1 to 4 times per year     Active Member of Clubs or Organizations: No     Attends Club or Organization Meetings: Never     Marital Status:    Intimate Partner Violence: Not At Risk (9/13/2023)    Humiliation, Afraid, Rape, and Kick questionnaire     Fear of Current or Ex-Partner: No     Emotionally Abused: No     Physically Abused: No     Sexually Abused: No   Housing Stability: Unknown (7/22/2025)    Housing Stability Vital Sign     Unable to Pay for Housing in the Last Year: No     Number of Times Moved in the Last Year: Not on file     Homeless in the Last Year: No       Diagnoses and all orders for this visit:  Malignant neoplasm of upper-inner quadrant of left breast in female, estrogen receptor positive  -     Clinic Appointment Request; Future  -     CBC and Auto Differential; Future  -     Comprehensive Metabolic Panel; Future  -     letrozole (Femara) 2.5 mg tablet; Take 1 tablet (2.5 mg total) by mouth once daily.  Take with or without food.  Other orders  -     Referral To Hematology and Oncology        Mo Poe MD                              [1]   Current Outpatient Medications:     cyanocobalamin (Vitamin B-12) 100 mcg tablet, Take 1 tablet (100 mcg) by mouth once daily., Disp: , Rfl:     ergocalciferol (Vitamin D-2) 1250 mcg (50,000 units) capsule, Take 1 capsule (1.25 mg) by mouth 1 (one) time per week., Disp: , Rfl:     MAGNESIUM GLUCONATE ORAL, Take 200 mg by mouth 1 time., Disp: , Rfl:     letrozole (Femara) 2.5 mg tablet, Take 1 tablet (2.5 mg total) by mouth once daily.  Take with or without food., Disp: 60 tablet, Rfl: 5  [2]   Past Surgical History:  Procedure Laterality Date    BASAL CELL CARCINOMA EXCISION Right     on face    BREAST BIOPSY      BREAST LUMPECTOMY Left 2025     SECTION, CLASSIC      HYSTERECTOMY      OOPHORECTOMY     [3]   Family History  Problem Relation Name Age of Onset    Colon cancer Mother  55    Heart disease Father      Colon cancer Sister  55    Lung cancer Sister      Cancer Brother

## 2025-09-29 ENCOUNTER — APPOINTMENT (OUTPATIENT)
Facility: CLINIC | Age: 70
End: 2025-09-29
Payer: MEDICARE

## 2025-10-17 ENCOUNTER — APPOINTMENT (OUTPATIENT)
Dept: SURGERY | Facility: CLINIC | Age: 70
End: 2025-10-17
Payer: MEDICARE

## (undated) DEVICE — RADIOGRAPHY DEVICE, SPECIMEN, TRANSPEC

## (undated) DEVICE — SUTURE, MONOCRYL, 4-0, 18 IN, PS2, UNDYED

## (undated) DEVICE — SOLUTION, IRRIGATION, STERILE WATER, 1000 ML, POUR BOTTLE

## (undated) DEVICE — GLOVE, SURGICAL, PROTEXIS PI BLUE W/NEUTHERA, 7.0, PF, LF

## (undated) DEVICE — ELECTRODE, ELECTROSURGICAL, BLADE, E-Z CLEAN, 4 IN

## (undated) DEVICE — HOLSTER, ELECTROSURGERY ACCESSORY, STERILE

## (undated) DEVICE — SUTURE, SILK, 2-0, TIES, 12-30 IN, BLACK

## (undated) DEVICE — COVER, TABLE, 44 X 75 IN, DISPOSABLE, LF, STERILE

## (undated) DEVICE — 00000 VISIT COUNTER

## (undated) DEVICE — PAD, GROUNDING, ELECTROSURGICAL, W/9 FT CABLE, POLYHESIVE II, ADULT, LF

## (undated) DEVICE — APPLICATOR, CHLORAPREP, W/ORANGE TINT, 26ML

## (undated) DEVICE — Device

## (undated) DEVICE — DRAPE, SHEET, FAN FOLDED, MEDIUM, 44 X 72 IN, DISPOSABLE, LF, STERILE

## (undated) DEVICE — DRESSING, MEPILEX BORDER, POST-OP AG, 4 X 6 IN

## (undated) DEVICE — SOLUTION, IRRIGATION, SODIUM CHLORIDE 0.9%, 1000 ML, POUR BOTTLE

## (undated) DEVICE — DRAPE, PAD, INSTRUMENT, MAGNETIC, MEDIUM, 10 X 16 IN, DISPOSABLE

## (undated) DEVICE — SUTURE, VICRYL, 3-0, 27 IN, SH

## (undated) DEVICE — GLOVE, SURGICAL, PROTEXIS PI , 7.0, PF, LF

## (undated) DEVICE — PROCEDURE KIT, ULTRASOUND, W/STERILE GEL AND TRANSDUCER COVER W/BAND, LF.

## (undated) DEVICE — DRAPE PACK, GENERAL, CUSTOM, GENEVA

## (undated) DEVICE — SUTURE, SILK, 4-0, 18 IN, FS2, BLACK

## (undated) DEVICE — LABELING SYSTEM, CORRECT MEDICATION, OR, 4 FLAGS, 2SETS OF 24

## (undated) DEVICE — KIT, MARGINMARKER, 6 INK COLORS, STANDARD

## (undated) DEVICE — SLEEVE, SUCTION, E-SEP, 165MM

## (undated) DEVICE — DRESSING, TELFA, 3X4

## (undated) DEVICE — SUTURE, POLYSORB* 3/0  30  VIOLET ON A GU-46 NEEDLE"

## (undated) DEVICE — CLIP, LIGATING, LIGACLIP EXTRA, MEDIUM, TITANIUM, WHITE